# Patient Record
Sex: MALE | Race: BLACK OR AFRICAN AMERICAN | NOT HISPANIC OR LATINO | URBAN - METROPOLITAN AREA
[De-identification: names, ages, dates, MRNs, and addresses within clinical notes are randomized per-mention and may not be internally consistent; named-entity substitution may affect disease eponyms.]

---

## 2023-07-26 ENCOUNTER — HOSPITAL ENCOUNTER (EMERGENCY)
Facility: HOSPITAL | Age: 43
Discharge: HOME | End: 2023-07-26
Attending: EMERGENCY MEDICINE
Payer: MEDICAID

## 2023-07-26 VITALS
DIASTOLIC BLOOD PRESSURE: 89 MMHG | HEIGHT: 75 IN | TEMPERATURE: 99 F | SYSTOLIC BLOOD PRESSURE: 129 MMHG | WEIGHT: 180 LBS | RESPIRATION RATE: 18 BRPM | HEART RATE: 75 BPM | BODY MASS INDEX: 22.38 KG/M2 | OXYGEN SATURATION: 99 %

## 2023-07-26 DIAGNOSIS — L03.115 CELLULITIS OF RIGHT LOWER EXTREMITY: Primary | ICD-10-CM

## 2023-07-26 DIAGNOSIS — H61.22 HEARING LOSS OF LEFT EAR DUE TO CERUMEN IMPACTION: ICD-10-CM

## 2023-07-26 DIAGNOSIS — L02.415 ABSCESS OF RIGHT LEG: ICD-10-CM

## 2023-07-26 PROCEDURE — 99284 EMERGENCY DEPT VISIT MOD MDM: CPT

## 2023-07-26 RX ORDER — IBUPROFEN 800 MG/1
800 TABLET ORAL EVERY 6 HOURS PRN
Qty: 20 TABLET | Refills: 0 | OUTPATIENT
Start: 2023-07-26 | End: 2023-09-02

## 2023-07-26 RX ORDER — SULFAMETHOXAZOLE AND TRIMETHOPRIM 800; 160 MG/1; MG/1
1 TABLET ORAL 2 TIMES DAILY
Qty: 14 TABLET | Refills: 0 | Status: SHIPPED | OUTPATIENT
Start: 2023-07-26 | End: 2023-08-02

## 2023-07-26 NOTE — Clinical Note
"Jesse Pedersonbarb Fofana was seen and treated in our emergency department on 7/26/2023.  He may return to work on 07/27/2023.       If you have any questions or concerns, please don't hesitate to call.      MADELEINE Reece"

## 2023-07-26 NOTE — ED PROVIDER NOTES
Encounter Date: 7/26/2023    SCRIBE #1 NOTE: I, Zoey Radha, am scribing for, and in the presence of,  MADELEINE Reece.     History     Chief Complaint   Patient presents with    Cellulitis     Pt with abscess and cellulitis to RLE x 3 days. No fever      44 yo M with no PMHx presents to the ED for wound to the right lower posterior leg onset 3d ago. States it may have started as a bug bite. He has been noting drainage from the wound. He further reports hearing loss to his L ear. No other modifying factors. No other associated symptoms. No h/o DM.     The history is provided by the patient.   Review of patient's allergies indicates:  No Known Allergies  History reviewed. No pertinent past medical history.  History reviewed. No pertinent surgical history.  History reviewed. No pertinent family history.  Social History     Tobacco Use    Smoking status: Never    Smokeless tobacco: Never   Substance Use Topics    Alcohol use: Not Currently    Drug use: Not Currently     Review of Systems   Constitutional:  Negative for chills and fever.   HENT:  Positive for hearing loss (L). Negative for congestion, rhinorrhea and sore throat.    Eyes:  Negative for visual disturbance.   Respiratory:  Negative for cough and shortness of breath.    Cardiovascular:  Negative for chest pain.   Gastrointestinal:  Negative for abdominal pain, diarrhea, nausea and vomiting.   Genitourinary:  Negative for dysuria, frequency and hematuria.   Musculoskeletal:  Negative for back pain.   Skin:  Positive for wound (R lower leg, + drainage). Negative for rash.   Neurological:  Negative for dizziness, weakness and headaches.     Physical Exam     Initial Vitals [07/26/23 1735]   BP Pulse Resp Temp SpO2   (!) 149/90 95 18 98.7 °F (37.1 °C) 100 %      MAP       --         Physical Exam    Nursing note and vitals reviewed.  Constitutional: He appears well-developed and well-nourished. No distress.   HENT:   Head: Normocephalic.   Right Ear:  External ear normal.   Left Ear: External ear normal.   L cerumen impaction.    Eyes: Conjunctivae are normal.   Pulmonary/Chest: No respiratory distress.   Musculoskeletal:         General: Normal range of motion.     Neurological: He is alert.   Skin: Skin is warm and dry. No rash noted.   Draining abscess to R lower posterior leg.    Psychiatric: He has a normal mood and affect. His behavior is normal. Judgment and thought content normal.       ED Course   Procedures  Labs Reviewed - No data to display       Imaging Results    None          Medications - No data to display  Medical Decision Making:   Initial Assessment:   Patient has cellulitis and a draining assets to posterior right lower leg.  No incision and drainage necessary at this time.  Patient instructed to apply warm compresses to affected area I will discharge patient home with Bactrim and ibuprofen.  Patient given return precautions.  Patient also has left cerumen impaction.  Left ear irrigated without complication.  Some cerumen removed but patient is still has cerumen in ear canal.  We were unable to remove.  Patient instructed to follow with ENT and avoid Cotton tips in the ear.        Scribe Attestation:   Scribe #1: I performed the above scribed service and the documentation accurately describes the services I performed. I attest to the accuracy of the note.                 I, Sulema Carr PA-C , personally performed the services described in this documentation. All medical record entries made by the scribe were at my direction and in my presence. I have reviewed the chart and agree that the record reflects my personal performance and is accurate and complete.    Clinical Impression:   Final diagnoses:  [L03.115] Cellulitis of right lower extremity (Primary)  [H61.22] Hearing loss of left ear due to cerumen impaction  [L02.415] Abscess of right leg        ED Disposition Condition    Discharge Stable          ED Prescriptions       Medication  Sig Dispense Start Date End Date Auth. Provider    sulfamethoxazole-trimethoprim 800-160mg (BACTRIM DS) 800-160 mg Tab Take 1 tablet by mouth 2 (two) times daily. for 7 days 14 tablet 7/26/2023 8/2/2023 MADELEINE Reece    ibuprofen (ADVIL,MOTRIN) 800 MG tablet Take 1 tablet (800 mg total) by mouth every 6 (six) hours as needed for Pain. 20 tablet 7/26/2023 -- MADELEINE Reece          Follow-up Information    None          MADELEINE Reece  07/26/23 1946

## 2023-07-26 NOTE — FIRST PROVIDER EVALUATION
"Medical screening examination initiated.  I have conducted a focused provider triage encounter, findings are as follows:    Brief history of present illness:  43 year old male presents with swelling and pain to posterior right ankle, right lower leg x several days, reports he thought it started as a bug bite.     Vitals:    07/26/23 1735   BP: (!) 149/90   Pulse: 95   Resp: 18   Temp: 98.7 °F (37.1 °C)   TempSrc: Oral   SpO2: 100%   Weight: 81.6 kg (180 lb)   Height: 6' 3" (1.905 m)       Pertinent physical exam:  Alert, NAD. Right leg appears swollen and erythematous compared to left. Difficult to visualized posterior leg due to working boots and pants.     Brief workup plan:  Room for exam, possible procedure.     Preliminary workup initiated; this workup will be continued and followed by the physician or advanced practice provider that is assigned to the patient when roomed.    Sophia Deleon MD   5:37 PM    "

## 2023-08-16 ENCOUNTER — HOSPITAL ENCOUNTER (EMERGENCY)
Facility: HOSPITAL | Age: 43
Discharge: HOME | End: 2023-08-16
Attending: EMERGENCY MEDICINE
Payer: COMMERCIAL

## 2023-08-16 VITALS
BODY MASS INDEX: 22.62 KG/M2 | WEIGHT: 181 LBS | RESPIRATION RATE: 18 BRPM | SYSTOLIC BLOOD PRESSURE: 153 MMHG | OXYGEN SATURATION: 100 % | HEART RATE: 76 BPM | DIASTOLIC BLOOD PRESSURE: 105 MMHG | TEMPERATURE: 98 F

## 2023-08-16 DIAGNOSIS — L03.90 WOUND CELLULITIS: ICD-10-CM

## 2023-08-16 DIAGNOSIS — L03.90 WOUND CELLULITIS: Primary | ICD-10-CM

## 2023-08-16 PROCEDURE — 63600175 PHARM REV CODE 636 W HCPCS: Performed by: EMERGENCY MEDICINE

## 2023-08-16 PROCEDURE — 25000003 PHARM REV CODE 250: Performed by: EMERGENCY MEDICINE

## 2023-08-16 PROCEDURE — 96372 THER/PROPH/DIAG INJ SC/IM: CPT | Performed by: EMERGENCY MEDICINE

## 2023-08-16 PROCEDURE — 99284 EMERGENCY DEPT VISIT MOD MDM: CPT

## 2023-08-16 RX ORDER — MELOXICAM 15 MG/1
15 TABLET ORAL DAILY
Qty: 30 TABLET | Refills: 0 | Status: ON HOLD | OUTPATIENT
Start: 2023-08-16 | End: 2023-09-26 | Stop reason: HOSPADM

## 2023-08-16 RX ORDER — FAMOTIDINE 20 MG/1
20 TABLET, FILM COATED ORAL 2 TIMES DAILY
Qty: 60 TABLET | Refills: 0 | Status: SHIPPED | OUTPATIENT
Start: 2023-08-16 | End: 2024-08-15

## 2023-08-16 RX ORDER — DOXYCYCLINE 100 MG/1
100 CAPSULE ORAL 2 TIMES DAILY
Qty: 20 CAPSULE | Refills: 0 | Status: SHIPPED | OUTPATIENT
Start: 2023-08-16 | End: 2023-08-26

## 2023-08-16 RX ORDER — CIPROFLOXACIN 500 MG/1
500 TABLET ORAL
Status: COMPLETED | OUTPATIENT
Start: 2023-08-16 | End: 2023-08-16

## 2023-08-16 RX ORDER — KETOROLAC TROMETHAMINE 30 MG/ML
30 INJECTION, SOLUTION INTRAMUSCULAR; INTRAVENOUS
Status: COMPLETED | OUTPATIENT
Start: 2023-08-16 | End: 2023-08-16

## 2023-08-16 RX ORDER — DOXYCYCLINE HYCLATE 100 MG
100 TABLET ORAL
Status: COMPLETED | OUTPATIENT
Start: 2023-08-16 | End: 2023-08-16

## 2023-08-16 RX ORDER — CIPROFLOXACIN 500 MG/1
500 TABLET ORAL 2 TIMES DAILY
Qty: 20 TABLET | Refills: 0 | Status: SHIPPED | OUTPATIENT
Start: 2023-08-16 | End: 2023-08-26

## 2023-08-16 RX ORDER — TRAMADOL HYDROCHLORIDE 50 MG/1
50 TABLET ORAL EVERY 4 HOURS PRN
Qty: 12 TABLET | Refills: 0 | Status: ON HOLD | OUTPATIENT
Start: 2023-08-16 | End: 2023-09-26 | Stop reason: HOSPADM

## 2023-08-16 RX ADMIN — CIPROFLOXACIN HYDROCHLORIDE 500 MG: 500 TABLET, FILM COATED ORAL at 06:08

## 2023-08-16 RX ADMIN — DOXYCYCLINE HYCLATE 100 MG: 100 TABLET, COATED ORAL at 06:08

## 2023-08-16 RX ADMIN — KETOROLAC TROMETHAMINE 30 MG: 30 INJECTION, SOLUTION INTRAMUSCULAR; INTRAVENOUS at 06:08

## 2023-08-16 NOTE — ED PROVIDER NOTES
Encounter Date: 8/16/2023       History     Chief Complaint   Patient presents with    Wound Check     Wound to posterior right calf, wound bed yellow sloughing, 4+ edema to rle, skin shiny and taught, warm to touch     43 y.o. male No past medical history on file.     Patient notes that he has had a posterior lower leg skin wound for approximately 2 weeks he went to an outside facility who started him on Bactrim he took 1 dose of Bactrim and pain medication and states that he lost both.  Denies fevers chills nausea vomiting diarrhea dysuria or other complaints.  Notes that the area is swollen however when he elevates leg the edema self resolves.  Also notes that he works around dirty water at work.      Review of patient's allergies indicates:  No Known Allergies  No past medical history on file.  No past surgical history on file.  No family history on file.  Social History     Tobacco Use    Smoking status: Never    Smokeless tobacco: Never   Substance Use Topics    Alcohol use: Not Currently    Drug use: Not Currently     Review of Systems   Constitutional:  Negative for fever.   HENT:  Negative for sore throat.    Respiratory:  Negative for shortness of breath.    Cardiovascular:  Negative for chest pain.   Gastrointestinal:  Negative for nausea.   Genitourinary:  Negative for dysuria.   Musculoskeletal:  Negative for back pain.   Skin:  Negative for rash.   Neurological:  Negative for weakness.   Hematological:  Does not bruise/bleed easily.   All other systems reviewed and are negative.      Physical Exam     Initial Vitals [08/16/23 1719]   BP Pulse Resp Temp SpO2   (!) 153/105 76 18 97.5 °F (36.4 °C) 100 %      MAP       --         Physical Exam    Nursing note and vitals reviewed.  Constitutional: He appears well-developed and well-nourished.   HENT:   Head: Normocephalic and atraumatic.   Eyes: EOM are normal. Pupils are equal, round, and reactive to light.   Cardiovascular:  Normal rate and regular  rhythm.           Pulmonary/Chest: Effort normal.   Abdominal: He exhibits no distension.   Musculoskeletal:         General: No tenderness or edema.     Neurological: He is alert and oriented to person, place, and time. No cranial nerve deficit.   Skin: Skin is warm and dry.   Psychiatric: He has a normal mood and affect.     Posterior RLE just below gastroch pt has a 2.5 x 1cm wound with yellow base    ED Course   Procedures  MEDICAL DECISION MAKING    After review of the patient's physical exam, ED testing, and history/symptoms, relevant labs, imaging, available outside records  a wide differential was considered including but not limited to: infectious, traumatic, vascular, toxicological , metabolic, malignant, ischemic, embolic, psychological, genetic, iatrogenic, idiopathic, medication reaction, environmental exposure, substance dependence/intoxication/withdrawal, electrolyte abnormality, blood dyscrasia, and other etiologies.        labs/imaging/interventions include:       Medications - No data to display  Labs Reviewed - No data to display   X-Ray Tibia Fibula 2 View Right    (Results Pending)     I have independently evaluated and interpreted all available labs and imaging to the extent of the scope of my practice.  The suspected diagnosis, treatment and plan were discussed with the patient. All questions or concerns have been addressed.    Note was created using voice recognition software. Note may have occasional typographical or grammatical errors , garbled syntax, and other bizarre constructions that may not have been identified and edited despite good brian initial review prior to signing.     Labs Reviewed - No data to display       Imaging Results    None          Medications - No data to display   PT refusing xray.    Will place on cipro/doxy/nsaids, give doctor's note for work, refer to wound care. Given he only took bactrim 1 day not a treatment failure.                     Clinical Impression:    Final diagnoses:  [L03.90] Wound cellulitis  [L03.90] Wound cellulitis - eval for Alejandra Jordan MD  08/16/23 8868

## 2023-08-16 NOTE — ED NOTES
Patient presents to ED for wound check. Patient states 1-2 weeks ago, dx with staph infection. Patient states was placed on Bactrim and took one day of medication, but then lost medication. Patient states swelling to leg will decrease over night, but in the morning with walking swelling gets worse. Patient states has been having yellow drainage from wound.

## 2023-08-16 NOTE — DISCHARGE INSTRUCTIONS

## 2023-08-16 NOTE — Clinical Note
"Jesse Pedersonbarb Fofana was seen and treated in our emergency department on 8/16/2023.  He may return to work on 08/21/2023.       If you have any questions or concerns, please don't hesitate to call.      Alejandra Yoo MD"

## 2023-09-02 ENCOUNTER — HOSPITAL ENCOUNTER (EMERGENCY)
Facility: HOSPITAL | Age: 43
Discharge: HOME | End: 2023-09-02
Attending: EMERGENCY MEDICINE
Payer: MEDICAID

## 2023-09-02 VITALS
OXYGEN SATURATION: 100 % | WEIGHT: 180 LBS | DIASTOLIC BLOOD PRESSURE: 78 MMHG | TEMPERATURE: 98 F | HEART RATE: 66 BPM | RESPIRATION RATE: 18 BRPM | BODY MASS INDEX: 22.38 KG/M2 | HEIGHT: 75 IN | SYSTOLIC BLOOD PRESSURE: 141 MMHG

## 2023-09-02 DIAGNOSIS — R60.0 EDEMA OF RIGHT LOWER LEG: ICD-10-CM

## 2023-09-02 DIAGNOSIS — S81.801A WOUND OF RIGHT LOWER EXTREMITY, INITIAL ENCOUNTER: Primary | ICD-10-CM

## 2023-09-02 LAB
ALBUMIN SERPL BCP-MCNC: 3.9 G/DL (ref 3.5–5.2)
ALP SERPL-CCNC: 54 U/L (ref 55–135)
ALT SERPL W/O P-5'-P-CCNC: 16 U/L (ref 10–44)
ANION GAP SERPL CALC-SCNC: 14 MMOL/L (ref 8–16)
AST SERPL-CCNC: 29 U/L (ref 10–40)
BASOPHILS # BLD AUTO: 0.04 K/UL (ref 0–0.2)
BASOPHILS NFR BLD: 0.5 % (ref 0–1.9)
BILIRUB SERPL-MCNC: 0.2 MG/DL (ref 0.1–1)
BUN SERPL-MCNC: 10 MG/DL (ref 6–20)
CALCIUM SERPL-MCNC: 9.8 MG/DL (ref 8.7–10.5)
CHLORIDE SERPL-SCNC: 102 MMOL/L (ref 95–110)
CO2 SERPL-SCNC: 23 MMOL/L (ref 23–29)
CREAT SERPL-MCNC: 0.9 MG/DL (ref 0.5–1.4)
DIFFERENTIAL METHOD: ABNORMAL
EOSINOPHIL # BLD AUTO: 0.2 K/UL (ref 0–0.5)
EOSINOPHIL NFR BLD: 2.8 % (ref 0–8)
ERYTHROCYTE [DISTWIDTH] IN BLOOD BY AUTOMATED COUNT: 13.8 % (ref 11.5–14.5)
EST. GFR  (NO RACE VARIABLE): >60 ML/MIN/1.73 M^2
GLUCOSE SERPL-MCNC: 62 MG/DL (ref 70–110)
HCT VFR BLD AUTO: 44.3 % (ref 40–54)
HGB BLD-MCNC: 14.8 G/DL (ref 14–18)
IMM GRANULOCYTES # BLD AUTO: 0.02 K/UL (ref 0–0.04)
IMM GRANULOCYTES NFR BLD AUTO: 0.2 % (ref 0–0.5)
LYMPHOCYTES # BLD AUTO: 2.3 K/UL (ref 1–4.8)
LYMPHOCYTES NFR BLD: 28.3 % (ref 18–48)
MCH RBC QN AUTO: 31.4 PG (ref 27–31)
MCHC RBC AUTO-ENTMCNC: 33.4 G/DL (ref 32–36)
MCV RBC AUTO: 94 FL (ref 82–98)
MONOCYTES # BLD AUTO: 0.8 K/UL (ref 0.3–1)
MONOCYTES NFR BLD: 9.6 % (ref 4–15)
NEUTROPHILS # BLD AUTO: 4.8 K/UL (ref 1.8–7.7)
NEUTROPHILS NFR BLD: 58.6 % (ref 38–73)
NRBC BLD-RTO: 0 /100 WBC
PLATELET # BLD AUTO: 253 K/UL (ref 150–450)
PMV BLD AUTO: 9.1 FL (ref 9.2–12.9)
POTASSIUM SERPL-SCNC: 4.5 MMOL/L (ref 3.5–5.1)
PROT SERPL-MCNC: 8.4 G/DL (ref 6–8.4)
RBC # BLD AUTO: 4.72 M/UL (ref 4.6–6.2)
SODIUM SERPL-SCNC: 139 MMOL/L (ref 136–145)
WBC # BLD AUTO: 8.16 K/UL (ref 3.9–12.7)

## 2023-09-02 PROCEDURE — 25000003 PHARM REV CODE 250: Performed by: EMERGENCY MEDICINE

## 2023-09-02 PROCEDURE — 85025 COMPLETE CBC W/AUTO DIFF WBC: CPT | Performed by: EMERGENCY MEDICINE

## 2023-09-02 PROCEDURE — 96367 TX/PROPH/DG ADDL SEQ IV INF: CPT

## 2023-09-02 PROCEDURE — 87186 SC STD MICRODIL/AGAR DIL: CPT | Mod: 59 | Performed by: EMERGENCY MEDICINE

## 2023-09-02 PROCEDURE — 96366 THER/PROPH/DIAG IV INF ADDON: CPT

## 2023-09-02 PROCEDURE — 63600175 PHARM REV CODE 636 W HCPCS: Performed by: EMERGENCY MEDICINE

## 2023-09-02 PROCEDURE — 96365 THER/PROPH/DIAG IV INF INIT: CPT

## 2023-09-02 PROCEDURE — 99284 EMERGENCY DEPT VISIT MOD MDM: CPT | Mod: 25

## 2023-09-02 PROCEDURE — 87077 CULTURE AEROBIC IDENTIFY: CPT | Performed by: EMERGENCY MEDICINE

## 2023-09-02 PROCEDURE — 87040 BLOOD CULTURE FOR BACTERIA: CPT | Mod: 59 | Performed by: EMERGENCY MEDICINE

## 2023-09-02 PROCEDURE — 80053 COMPREHEN METABOLIC PANEL: CPT | Performed by: EMERGENCY MEDICINE

## 2023-09-02 RX ORDER — IBUPROFEN 600 MG/1
600 TABLET ORAL EVERY 6 HOURS PRN
Qty: 20 TABLET | Refills: 0 | Status: ON HOLD | OUTPATIENT
Start: 2023-09-02 | End: 2023-09-26 | Stop reason: HOSPADM

## 2023-09-02 RX ORDER — SULFAMETHOXAZOLE AND TRIMETHOPRIM 800; 160 MG/1; MG/1
1 TABLET ORAL 2 TIMES DAILY
Qty: 14 TABLET | Refills: 0 | Status: SHIPPED | OUTPATIENT
Start: 2023-09-02 | End: 2023-09-09

## 2023-09-02 RX ORDER — SULFAMETHOXAZOLE AND TRIMETHOPRIM 800; 160 MG/1; MG/1
1 TABLET ORAL 2 TIMES DAILY
Qty: 14 TABLET | Refills: 0 | Status: SHIPPED | OUTPATIENT
Start: 2023-09-02 | End: 2023-09-02 | Stop reason: ALTCHOICE

## 2023-09-02 RX ORDER — IBUPROFEN 600 MG/1
600 TABLET ORAL
Status: COMPLETED | OUTPATIENT
Start: 2023-09-02 | End: 2023-09-02

## 2023-09-02 RX ADMIN — PIPERACILLIN AND TAZOBACTAM 4.5 G: 4; .5 INJECTION, POWDER, LYOPHILIZED, FOR SOLUTION INTRAVENOUS; PARENTERAL at 04:09

## 2023-09-02 RX ADMIN — IBUPROFEN 600 MG: 600 TABLET ORAL at 05:09

## 2023-09-02 RX ADMIN — BACITRACIN ZINC, NEOMYCIN, POLYMYXIN B 1 EACH: 400; 3.5; 5 OINTMENT TOPICAL at 05:09

## 2023-09-02 RX ADMIN — VANCOMYCIN HYDROCHLORIDE 1750 MG: 10 INJECTION, POWDER, LYOPHILIZED, FOR SOLUTION INTRAVENOUS at 05:09

## 2023-09-02 NOTE — ED TRIAGE NOTES
Patient comes in with posterior right calf wound with 3+ edema, and slough noted to wound bed, states has been dealing with this wound for several weeks, has not been compliant die to drinking.

## 2023-09-02 NOTE — DISCHARGE INSTRUCTIONS
Please apply Neosporin dressings to your wound daily and change them daily.  Please apply an Ace compressive bandage to the swollen parts of your right foot ankle and lower leg.  Return immediately if you get worse or if new problems develop.  Please follow-up at the wound care clinic above.  You may also follow-up with the wound care doctor above.  Call Monday morning for appointments.  Elevate your right leg.

## 2023-09-02 NOTE — LETTER
September 3, 2023    Jesse Fofana  [address] [address]             [facility] - Emergency Dept  [address] [place], [place] [address] [address]  Phone: [phone] Dear Mr. Fofana:    One of your labs came back and were concerned about an infection in your blood.  Important that you us or come to the emergency department as soon as possible.  Our phone #[phone]    We have attempted to call you but none of your contacts answered or had voicemail that we can leave a message and your phone did not answer.    Sincerely,        Yashira Moreno MD

## 2023-09-02 NOTE — ED PROVIDER NOTES
"Encounter Date: 9/2/2023    SCRIBE #1 NOTE: I, Delisa Velasquez, am scribing for, and in the presence of,  Teofilo Roman MD. I have scribed the following portions of the note - Other sections scribed: HPI, ROS.       History     Chief Complaint   Patient presents with    Wound Check     PT presents to ED requesting evaluation of wound to posterior R lower leg/ankle. PT reports was previously seen and dx with "infection", but states he "didn't do the right thing" regarding care for wound. Ulcerated wound to posterior ankle noted, approx 4x4 with swelling noted from calf to knee. PT reports leg swelling began approx 2 days ago. Denies fever.      Jesse Fofana is a 43 y.o. male, with no PMHx, who presents to the ED with concerns about wound on posterior right ankle. Patient also has swelling to right ankle, foot, and calf that began in his foot 2 days ago. Pt states he did not follow up with wound care and does not still have his antibiotics. Pt states he was told to stay off his leg, but that he "has to work," and did not. No other exacerbating or alleviating factors. Denies vomiting, diarrhea, abdominal pain, chest pain, SOB, dysuria, chills, or other associated symptoms.       The history is provided by the patient. No  was used.     Review of patient's allergies indicates:  No Known Allergies  History reviewed. No pertinent past medical history.  History reviewed. No pertinent surgical history.  History reviewed. No pertinent family history.  Social History     Tobacco Use    Smoking status: Never    Smokeless tobacco: Never   Substance Use Topics    Alcohol use: Yes     Alcohol/week: 3.0 - 4.0 standard drinks of alcohol     Types: 3 - 4 Cans of beer per week     Comment: daily drinker    Drug use: Not Currently     Review of Systems   Constitutional:  Negative for chills, diaphoresis and fever.   HENT:  Negative for ear pain and sore throat.    Eyes:  Negative for pain.   Respiratory: "  Negative for cough and shortness of breath.    Cardiovascular:  Negative for chest pain.   Gastrointestinal:  Negative for abdominal pain, diarrhea, nausea and vomiting.   Genitourinary:  Negative for dysuria.   Musculoskeletal:  Negative for back pain.        (-) Arm or leg trouble.    Skin:  Positive for wound (posterior right ankle with swelling in ankle, calf, and foot). Negative for rash.   Neurological:  Negative for headaches.   Psychiatric/Behavioral:  Negative for confusion.        Physical Exam     Initial Vitals [09/02/23 1615]   BP Pulse Resp Temp SpO2   (!) 162/103 72 18 98.1 °F (36.7 °C) 99 %      MAP       --         Physical Exam  The patient was examined specifically for the following:   General:No significant distress, Good color, Warm and dry. Head and neck:Scalp atraumatic, Neck supple. Neurological:Appropriate conversation, Gross motor deficits. Eyes:Conjugate gaze, Clear corneas. ENT: No epistaxis. Cardiac: Regular rate and rhythm, Grossly normal heart tones. Pulmonary: Wheezing, Rales. Gastrointestinal: Abdominal tenderness, Abdominal distention. Musculoskeletal: Extremity deformity, Apparent pain with range of motion of the joints. Skin: Rash.   The findings on examination were normal except for the following:  The patient has marked edema of the right foot, the right ankle, the edema extends to the proximal 3rd of the right thigh.  There is minimal erythema and warmth.  There is a 4 x 4 cm wound in the posterior aspect of the right ankle. .  The heart tones are normal.  The abdomen is soft.  The patient is not febrile.  There is no tachycardia.  ED Course   Procedures  Labs Reviewed   COMPREHENSIVE METABOLIC PANEL - Abnormal; Notable for the following components:       Result Value    Glucose 62 (*)     Alkaline Phosphatase 54 (*)     All other components within normal limits   CBC W/ AUTO DIFFERENTIAL - Abnormal; Notable for the following components:    MCH 31.4 (*)     MPV 9.1 (*)     All  other components within normal limits   CULTURE, BLOOD   CULTURE, BLOOD          Imaging Results    None          Medications   vancomycin (VANCOCIN) 1,750 mg in dextrose 5 % (D5W) 500 mL IVPB (1,750 mg Intravenous New Bag 9/2/23 1736)   piperacillin-tazobactam (ZOSYN) 4.5 g in dextrose 5 % in water (D5W) 100 mL IVPB (MB+) (0 g Intravenous Stopped 9/2/23 1814)   ibuprofen tablet 600 mg (600 mg Oral Given 9/2/23 1720)   neomycin-bacitracnZn-polymyxnB packet (1 each Topical (Top) Given 9/2/23 1720)     Medical Decision Making  Amount and/or Complexity of Data Reviewed  Labs: ordered.    Risk  OTC drugs.  Prescription drug management.    This patient presents emergency room with swelling of his right foot and ankle.  He also has swelling of the right lower leg.  The proximal leg is not affected the popliteal fossa medial thigh are not affected.  I doubt deep venous thrombosis.  I morning whether there is some element of lymphedema here the patient has chronic wound on the posterior aspect of the right leg it does not look deep.  It has minimal yellow eschar.  The patient is not febrile or tachycardic.  I doubt systemic infection.  I will treat this patient with Bactrim.  I will have him follow up at wound care.  I will have him wrap his leg.  I will use an Ace bandage.  I will have him changes dressings daily.  I will have him elevate his right leg.        Scribe Attestation:   Scribe #1: I performed the above scribed service and the documentation accurately describes the services I performed. I attest to the accuracy of the note.               I personally performed the services described in this documentation.  All medical record  entries made by the scribe are at my direction and in my presence.  Signed, Dr. Roman          Clinical Impression:   Final diagnoses:  [S81.801A] Wound of right lower extremity, initial encounter (Primary)  [R60.0] Edema of right lower leg        ED Disposition Condition    Discharge  Stable          ED Prescriptions       Medication Sig Dispense Start Date End Date Auth. Provider    sulfamethoxazole-trimethoprim 800-160mg (BACTRIM DS) 800-160 mg Tab Take 1 tablet by mouth 2 (two) times daily. for 7 days 14 tablet 9/2/2023 9/9/2023 Teofilo Roman MD    sulfamethoxazole-trimethoprim 800-160mg (BACTRIM DS) 800-160 mg Tab Take 1 tablet by mouth 2 (two) times daily. for 7 days 14 tablet 9/2/2023 9/9/2023 Teofilo Roman MD    ibuprofen (ADVIL,MOTRIN) 600 MG tablet Take 1 tablet (600 mg total) by mouth every 6 (six) hours as needed. 20 tablet 9/2/2023 -- Teofilo Roman MD          Follow-up Information       Follow up With Specialties Details Why Contact Info Additional Information    [facility] - Wound Care Wound Care In 3 days  [address]  [facility] [address] [phone] Please park in garage or rear surface lot and enter through Patient Registration entrance. Check in at first floor main registration.     Oliver Canada MD Family Medicine, Wound Care In 3 days  [address] [facility] [address] [facility] [address] [phone]                Teofilo Roman MD  09/02/23 3663

## 2023-09-03 NOTE — PROVIDER PROGRESS NOTES - EMERGENCY DEPT.
Encounter Date: 9/2/2023    ED Physician Progress Notes        Was called concerning blood cultures positive in 1 out of for culture bottles.  Gram positive cocci in pairs consistent with Streptococcus.    Performed a chart review attempted to reach the patient.  I called the home phone as well as the contact information of significant other.  No answer on either.  I was unable to leave a voicemail on either.    Also called sister with no answer, and unable to leave a message   Brice Mcknight [phone]       Certified letter is sent to address on the chart.

## 2023-09-06 LAB
BACTERIA BLD CULT: ABNORMAL
BACTERIA BLD CULT: NORMAL

## 2023-09-24 ENCOUNTER — HOSPITAL ENCOUNTER (INPATIENT)
Facility: HOSPITAL | Age: 43
LOS: 3 days | Discharge: HOME | DRG: 603 | End: 2023-09-27
Attending: EMERGENCY MEDICINE | Admitting: STUDENT IN AN ORGANIZED HEALTH CARE EDUCATION/TRAINING PROGRAM
Payer: MEDICAID

## 2023-09-24 DIAGNOSIS — R78.81 BACTEREMIA: ICD-10-CM

## 2023-09-24 DIAGNOSIS — I82.511 CHRONIC DEEP VEIN THROMBOSIS (DVT) OF FEMORAL VEIN OF RIGHT LOWER EXTREMITY: ICD-10-CM

## 2023-09-24 DIAGNOSIS — Z78.9 FAILURE OF OUTPATIENT TREATMENT: Primary | ICD-10-CM

## 2023-09-24 DIAGNOSIS — I87.2 VENOUS INSUFFICIENCY: ICD-10-CM

## 2023-09-24 DIAGNOSIS — R07.9 CHEST PAIN: ICD-10-CM

## 2023-09-24 DIAGNOSIS — I82.531 CHRONIC DEEP VEIN THROMBOSIS (DVT) OF POPLITEAL VEIN OF RIGHT LOWER EXTREMITY: ICD-10-CM

## 2023-09-24 DIAGNOSIS — I82.4Y1 ACUTE DEEP VEIN THROMBOSIS (DVT) OF PROXIMAL VEIN OF RIGHT LOWER EXTREMITY: ICD-10-CM

## 2023-09-24 DIAGNOSIS — M79.89 RIGHT LEG SWELLING: ICD-10-CM

## 2023-09-24 DIAGNOSIS — E83.39 HYPOPHOSPHATEMIA: ICD-10-CM

## 2023-09-24 DIAGNOSIS — L03.115 CELLULITIS OF RIGHT LOWER EXTREMITY: ICD-10-CM

## 2023-09-24 DIAGNOSIS — L03.115 CELLULITIS OF RIGHT ANKLE: ICD-10-CM

## 2023-09-24 PROBLEM — M25.572 CHRONIC PAIN OF LEFT ANKLE: Status: ACTIVE | Noted: 2018-05-24

## 2023-09-24 PROBLEM — F10.10 ALCOHOL ABUSE: Status: ACTIVE | Noted: 2023-05-09

## 2023-09-24 PROBLEM — Z86.711 HISTORY OF PULMONARY EMBOLUS (PE): Status: ACTIVE | Noted: 2023-09-24

## 2023-09-24 PROBLEM — G89.29 CHRONIC PAIN OF LEFT ANKLE: Status: ACTIVE | Noted: 2018-05-24

## 2023-09-24 PROBLEM — I26.99 ACUTE PULMONARY EMBOLISM: Status: ACTIVE | Noted: 2023-05-09

## 2023-09-24 LAB
ALBUMIN SERPL BCP-MCNC: 3.5 G/DL (ref 3.5–5.2)
ALP SERPL-CCNC: 61 U/L (ref 55–135)
ALT SERPL W/O P-5'-P-CCNC: 13 U/L (ref 10–44)
ANION GAP SERPL CALC-SCNC: 10 MMOL/L (ref 8–16)
AST SERPL-CCNC: 23 U/L (ref 10–40)
BASOPHILS # BLD AUTO: 0.04 K/UL (ref 0–0.2)
BASOPHILS NFR BLD: 0.4 % (ref 0–1.9)
BILIRUB SERPL-MCNC: 0.2 MG/DL (ref 0.1–1)
BUN SERPL-MCNC: 8 MG/DL (ref 6–20)
CALCIUM SERPL-MCNC: 8.7 MG/DL (ref 8.7–10.5)
CHLORIDE SERPL-SCNC: 107 MMOL/L (ref 95–110)
CO2 SERPL-SCNC: 24 MMOL/L (ref 23–29)
CREAT SERPL-MCNC: 0.7 MG/DL (ref 0.5–1.4)
DIFFERENTIAL METHOD: ABNORMAL
EOSINOPHIL # BLD AUTO: 0.2 K/UL (ref 0–0.5)
EOSINOPHIL NFR BLD: 1.8 % (ref 0–8)
ERYTHROCYTE [DISTWIDTH] IN BLOOD BY AUTOMATED COUNT: 13.8 % (ref 11.5–14.5)
EST. GFR  (NO RACE VARIABLE): >60 ML/MIN/1.73 M^2
GLUCOSE SERPL-MCNC: 91 MG/DL (ref 70–110)
HCT VFR BLD AUTO: 41.9 % (ref 40–54)
HGB BLD-MCNC: 14 G/DL (ref 14–18)
IMM GRANULOCYTES # BLD AUTO: 0.02 K/UL (ref 0–0.04)
IMM GRANULOCYTES NFR BLD AUTO: 0.2 % (ref 0–0.5)
LYMPHOCYTES # BLD AUTO: 2.5 K/UL (ref 1–4.8)
LYMPHOCYTES NFR BLD: 28.1 % (ref 18–48)
MCH RBC QN AUTO: 31 PG (ref 27–31)
MCHC RBC AUTO-ENTMCNC: 33.4 G/DL (ref 32–36)
MCV RBC AUTO: 93 FL (ref 82–98)
MONOCYTES # BLD AUTO: 0.6 K/UL (ref 0.3–1)
MONOCYTES NFR BLD: 6.2 % (ref 4–15)
NEUTROPHILS # BLD AUTO: 5.7 K/UL (ref 1.8–7.7)
NEUTROPHILS NFR BLD: 63.3 % (ref 38–73)
NRBC BLD-RTO: 0 /100 WBC
PLATELET # BLD AUTO: 255 K/UL (ref 150–450)
PMV BLD AUTO: 9 FL (ref 9.2–12.9)
POTASSIUM SERPL-SCNC: 4.1 MMOL/L (ref 3.5–5.1)
PROCALCITONIN SERPL IA-MCNC: 0.08 NG/ML
PROT SERPL-MCNC: 7.9 G/DL (ref 6–8.4)
RBC # BLD AUTO: 4.52 M/UL (ref 4.6–6.2)
SODIUM SERPL-SCNC: 141 MMOL/L (ref 136–145)
WBC # BLD AUTO: 8.98 K/UL (ref 3.9–12.7)

## 2023-09-24 PROCEDURE — 25000003 PHARM REV CODE 250: Performed by: NURSE PRACTITIONER

## 2023-09-24 PROCEDURE — 99285 EMERGENCY DEPT VISIT HI MDM: CPT | Mod: 25

## 2023-09-24 PROCEDURE — 87040 BLOOD CULTURE FOR BACTERIA: CPT | Performed by: NURSE PRACTITIONER

## 2023-09-24 PROCEDURE — 11000001 HC ACUTE MED/SURG PRIVATE ROOM

## 2023-09-24 PROCEDURE — 96365 THER/PROPH/DIAG IV INF INIT: CPT

## 2023-09-24 PROCEDURE — 96375 TX/PRO/DX INJ NEW DRUG ADDON: CPT

## 2023-09-24 PROCEDURE — 96367 TX/PROPH/DG ADDL SEQ IV INF: CPT

## 2023-09-24 PROCEDURE — 63600175 PHARM REV CODE 636 W HCPCS: Performed by: STUDENT IN AN ORGANIZED HEALTH CARE EDUCATION/TRAINING PROGRAM

## 2023-09-24 PROCEDURE — 85025 COMPLETE CBC W/AUTO DIFF WBC: CPT | Performed by: NURSE PRACTITIONER

## 2023-09-24 PROCEDURE — 25000003 PHARM REV CODE 250: Performed by: STUDENT IN AN ORGANIZED HEALTH CARE EDUCATION/TRAINING PROGRAM

## 2023-09-24 PROCEDURE — 80053 COMPREHEN METABOLIC PANEL: CPT | Performed by: NURSE PRACTITIONER

## 2023-09-24 PROCEDURE — 63600175 PHARM REV CODE 636 W HCPCS: Performed by: EMERGENCY MEDICINE

## 2023-09-24 PROCEDURE — 63600175 PHARM REV CODE 636 W HCPCS: Performed by: NURSE PRACTITIONER

## 2023-09-24 PROCEDURE — 25000003 PHARM REV CODE 250: Performed by: EMERGENCY MEDICINE

## 2023-09-24 PROCEDURE — 84145 PROCALCITONIN (PCT): CPT | Performed by: STUDENT IN AN ORGANIZED HEALTH CARE EDUCATION/TRAINING PROGRAM

## 2023-09-24 PROCEDURE — 36415 COLL VENOUS BLD VENIPUNCTURE: CPT | Performed by: STUDENT IN AN ORGANIZED HEALTH CARE EDUCATION/TRAINING PROGRAM

## 2023-09-24 RX ORDER — GLUCAGON 1 MG
1 KIT INJECTION
Status: DISCONTINUED | OUTPATIENT
Start: 2023-09-24 | End: 2023-09-27 | Stop reason: HOSPADM

## 2023-09-24 RX ORDER — MUPIROCIN 20 MG/G
1 OINTMENT TOPICAL
Status: COMPLETED | OUTPATIENT
Start: 2023-09-24 | End: 2023-09-24

## 2023-09-24 RX ORDER — ONDANSETRON 2 MG/ML
4 INJECTION INTRAMUSCULAR; INTRAVENOUS
Status: COMPLETED | OUTPATIENT
Start: 2023-09-24 | End: 2023-09-24

## 2023-09-24 RX ORDER — ENOXAPARIN SODIUM 100 MG/ML
1 INJECTION SUBCUTANEOUS EVERY 24 HOURS
Status: DISCONTINUED | OUTPATIENT
Start: 2023-09-24 | End: 2023-09-24

## 2023-09-24 RX ORDER — IBUPROFEN 200 MG
24 TABLET ORAL
Status: DISCONTINUED | OUTPATIENT
Start: 2023-09-24 | End: 2023-09-27 | Stop reason: HOSPADM

## 2023-09-24 RX ORDER — IBUPROFEN 200 MG
16 TABLET ORAL
Status: DISCONTINUED | OUTPATIENT
Start: 2023-09-24 | End: 2023-09-27 | Stop reason: HOSPADM

## 2023-09-24 RX ORDER — HYDROMORPHONE HYDROCHLORIDE 1 MG/ML
1 INJECTION, SOLUTION INTRAMUSCULAR; INTRAVENOUS; SUBCUTANEOUS
Status: COMPLETED | OUTPATIENT
Start: 2023-09-24 | End: 2023-09-24

## 2023-09-24 RX ORDER — MORPHINE SULFATE 4 MG/ML
4 INJECTION, SOLUTION INTRAMUSCULAR; INTRAVENOUS EVERY 4 HOURS PRN
Status: DISCONTINUED | OUTPATIENT
Start: 2023-09-24 | End: 2023-09-26

## 2023-09-24 RX ORDER — NALOXONE HCL 0.4 MG/ML
0.02 VIAL (ML) INJECTION
Status: DISCONTINUED | OUTPATIENT
Start: 2023-09-24 | End: 2023-09-27 | Stop reason: HOSPADM

## 2023-09-24 RX ORDER — HYDROCODONE BITARTRATE AND ACETAMINOPHEN 5; 325 MG/1; MG/1
1 TABLET ORAL EVERY 6 HOURS PRN
Status: DISCONTINUED | OUTPATIENT
Start: 2023-09-24 | End: 2023-09-27 | Stop reason: HOSPADM

## 2023-09-24 RX ORDER — SODIUM CHLORIDE 0.9 % (FLUSH) 0.9 %
10 SYRINGE (ML) INJECTION EVERY 12 HOURS PRN
Status: DISCONTINUED | OUTPATIENT
Start: 2023-09-24 | End: 2023-09-27 | Stop reason: HOSPADM

## 2023-09-24 RX ORDER — ONDANSETRON 2 MG/ML
4 INJECTION INTRAMUSCULAR; INTRAVENOUS EVERY 8 HOURS PRN
Status: DISCONTINUED | OUTPATIENT
Start: 2023-09-24 | End: 2023-09-27 | Stop reason: HOSPADM

## 2023-09-24 RX ORDER — ACETAMINOPHEN 325 MG/1
650 TABLET ORAL EVERY 4 HOURS PRN
Status: DISCONTINUED | OUTPATIENT
Start: 2023-09-24 | End: 2023-09-27 | Stop reason: HOSPADM

## 2023-09-24 RX ADMIN — PIPERACILLIN AND TAZOBACTAM 4.5 G: 4; .5 INJECTION, POWDER, LYOPHILIZED, FOR SOLUTION INTRAVENOUS; PARENTERAL at 11:09

## 2023-09-24 RX ADMIN — HYDROMORPHONE HYDROCHLORIDE 1 MG: 1 INJECTION, SOLUTION INTRAMUSCULAR; INTRAVENOUS; SUBCUTANEOUS at 11:09

## 2023-09-24 RX ADMIN — ONDANSETRON 4 MG: 2 INJECTION INTRAMUSCULAR; INTRAVENOUS at 11:09

## 2023-09-24 RX ADMIN — MUPIROCIN 22 G: 20 OINTMENT TOPICAL at 11:09

## 2023-09-24 RX ADMIN — APIXABAN 10 MG: 5 TABLET, FILM COATED ORAL at 08:09

## 2023-09-24 RX ADMIN — MORPHINE SULFATE 4 MG: 4 INJECTION, SOLUTION INTRAMUSCULAR; INTRAVENOUS at 08:09

## 2023-09-24 RX ADMIN — VANCOMYCIN HYDROCHLORIDE 1750 MG: 10 INJECTION, POWDER, LYOPHILIZED, FOR SOLUTION INTRAVENOUS at 01:09

## 2023-09-24 RX ADMIN — MORPHINE SULFATE 4 MG: 4 INJECTION, SOLUTION INTRAMUSCULAR; INTRAVENOUS at 04:09

## 2023-09-24 RX ADMIN — PIPERACILLIN AND TAZOBACTAM 4.5 G: 4; .5 INJECTION, POWDER, LYOPHILIZED, FOR SOLUTION INTRAVENOUS; PARENTERAL at 07:09

## 2023-09-24 RX ADMIN — ENOXAPARIN SODIUM 80 MG: 80 INJECTION SUBCUTANEOUS at 02:09

## 2023-09-24 NOTE — ASSESSMENT & PLAN NOTE
Presents with unhealing cellulitis to the right lower extremity.  Has been prescribed Bactrim, doxycycline and ciprofloxacin in the past with no improvement.  Unclear if he was actually taking these medications though.   - IV zosyn/vanc and de-escalate  - wound culture ordered  - xray with no joint involvement  - continue with supportive care

## 2023-09-24 NOTE — ED PROVIDER NOTES
Encounter Date: 9/24/2023       History     Chief Complaint   Patient presents with    Ankle Pain     Reports abscess to right ankle x's 3 wks. Reports was seen and given ibuprofen. Denies fever.      Chief complaint: Right ankle infection    History of present illness: Patient is a 43-year-old male who presents the emergency department for right ankle pain secondary to infection.  He was seen here 1st on July 26, 2023 by MADELEINE Leone, who diagnosed him with cellulitis and abscess she prescribed Bactrim DS and ibuprofen which was ineffective.  The patient returned on August 16, 2023 was seen by Dr. Yoo who diagnosed him with wound cellulitis prescribed Cipro doxycycline Mobic and tramadol patient was referred to wound clinic and discharged home.  He returned on September 2nd diagnosed with wound of right lower extremity by Dr. Roman prescribed Bactrim ds and ibuprofen.  He is returned today for worsening pain drainage and odor.    The history is provided by the patient. No  was used.     Review of patient's allergies indicates:  No Known Allergies  No past medical history on file.  No past surgical history on file.  No family history on file.  Social History     Tobacco Use    Smoking status: Never    Smokeless tobacco: Never   Substance Use Topics    Alcohol use: Yes     Alcohol/week: 3.0 - 4.0 standard drinks of alcohol     Types: 3 - 4 Cans of beer per week     Comment: daily drinker    Drug use: Not Currently     Review of Systems   Constitutional:  Negative for appetite change, chills, diaphoresis, fatigue and fever.   HENT:  Negative for congestion, ear discharge, ear pain, postnasal drip, rhinorrhea, sinus pressure, sneezing, sore throat and voice change.    Eyes:  Negative for discharge, itching and visual disturbance.   Respiratory:  Negative for cough, shortness of breath and wheezing.    Cardiovascular:  Negative for chest pain, palpitations and leg swelling.   Gastrointestinal:   Negative for abdominal pain, nausea and vomiting.   Endocrine: Negative for polydipsia, polyphagia and polyuria.   Genitourinary:  Negative for difficulty urinating, dysuria, frequency, hematuria, penile discharge, penile pain, penile swelling and urgency.   Musculoskeletal:  Negative for arthralgias and myalgias.   Skin:  Positive for wound. Negative for rash.   Neurological:  Negative for dizziness, seizures, syncope and weakness.   Hematological:  Negative for adenopathy. Does not bruise/bleed easily.   Psychiatric/Behavioral:  Negative for agitation and self-injury. The patient is not nervous/anxious.        Physical Exam     Initial Vitals [09/24/23 1026]   BP Pulse Resp Temp SpO2   (!) 157/90 86 16 97.5 °F (36.4 °C) 97 %      MAP       --         Physical Exam    Nursing note and vitals reviewed.  Constitutional: He appears well-developed and well-nourished. He is not diaphoretic. No distress.   HENT:   Head: Normocephalic and atraumatic.   Right Ear: External ear normal.   Left Ear: External ear normal.   Nose: Nose normal.   Eyes: Pupils are equal, round, and reactive to light. Right eye exhibits no discharge. Left eye exhibits no discharge. No scleral icterus.   Neck:   Normal range of motion.  Pulmonary/Chest: No respiratory distress.   Abdominal: He exhibits no distension.   Musculoskeletal:         General: Normal range of motion.      Cervical back: Normal range of motion.      Comments: There is an irregularly shaped wound to the posterior of the right ankle with significant odor, serous sanguinous drainage and generalized edema from toes to knee the entire area is quite tender.  Distal P SM is intact.     Neurological: He is alert and oriented to person, place, and time.   Skin: Skin is dry. Capillary refill takes less than 2 seconds.         ED Course   Critical Care    Date/Time: 9/24/2023 2:39 PM    Performed by: Ronny Kong DNP  Authorized by: Sophia Deleon MD  Direct patient  critical care time: 5 minutes  Additional history critical care time: 5 minutes  Ordering / reviewing critical care time: 5 minutes  Documentation critical care time: 5 minutes  Consulting other physicians critical care time: 5 minutes  Consult with family critical care time: 5 minutes  Other critical care time: 0 minutes  Total critical care time (exclusive of procedural time) : 30 minutes  Critical care time was exclusive of separately billable procedures and treating other patients and teaching time.  Critical care was necessary to treat or prevent imminent or life-threatening deterioration of the following conditions: cardiac failure, circulatory failure, sepsis, shock and toxidrome.  Critical care was time spent personally by me on the following activities: discussions with consultants, discussions with primary provider, development of treatment plan with patient or surrogate, evaluation of patient's response to treatment, examination of patient, obtaining history from patient or surrogate, ordering and performing treatments and interventions, ordering and review of laboratory studies, ordering and review of radiographic studies and review of old charts.        Labs Reviewed   CBC W/ AUTO DIFFERENTIAL - Abnormal; Notable for the following components:       Result Value    RBC 4.52 (*)     MPV 9.0 (*)     All other components within normal limits   CULTURE, BLOOD   CULTURE, BLOOD   CULTURE, AEROBIC  (SPECIFY SOURCE)   COMPREHENSIVE METABOLIC PANEL   POCT GLUCOSE MONITORING CONTINUOUS          Imaging Results              US Lower Extremity Veins Right (Final result)  Result time 09/24/23 13:26:33      Final result by Hector Underwood MD (09/24/23 13:26:33)                   Impression:      Nonocclusive DVT in the femoral and popliteal veins with collateral vessel formation, which may be chronic.    Solitary peroneal and also posterior tibial veins are identified and appear patent, noting thrombosis of a  nonvisualized potential paired vein not excluded on the basis of this examination.      Electronically signed by: Hector Underwood MD  Date:    09/24/2023  Time:    13:26               Narrative:    EXAMINATION:  US LOWER EXTREMITY VEINS RIGHT    CLINICAL HISTORY:  Other specified soft tissue disorders    TECHNIQUE:  Duplex and color flow Doppler evaluation and graded compression of the right lower extremity veins was performed.    COMPARISON:  None    FINDINGS:  Right thigh veins: Nonocclusive thrombus seen within the femoral vein and popliteal vein with multiple collateral vessels also noted, presumably chronic.  Common femoral and greater saphenous veins appear patent with normal directional flow and waveforms.    Right calf veins: Paired anterior tibial veins are patent.  Solitary peroneal and also posterior tibial veins are identified and appear patent, which may be normal variant.    Contralateral CFV: The contralateral (left) common femoral vein is patent and free of thrombus.    Miscellaneous: None                                       X-Ray Ankle Complete Right (Final result)  Result time 09/24/23 12:00:59      Final result by Stacy Tierney MD (09/24/23 12:00:59)                   Impression:      As above.      Electronically signed by: Stacy Tierney MD  Date:    09/24/2023  Time:    12:00               Narrative:    EXAMINATION:  XR ANKLE COMPLETE 3 VIEW RIGHT    CLINICAL HISTORY:  Cellulitis of right lower limb    TECHNIQUE:  AP, lateral, and oblique images of the right ankle were performed.    COMPARISON:  None    FINDINGS:  No acute fracture or bony destructive process is seen.  Alignment is preserved.  There is soft tissue swelling about the ankle.  Overlying bandage material obscures detail somewhat.  There is irregularity of the skin surface posterior to the distal tibia and fibula; it would be difficult to exclude a skin defect or open wound at this site.  No definite air is seen in the soft  tissues, but this could be obscured by the bandage material.                                       Medications   vancomycin - pharmacy to dose (has no administration in time range)   vancomycin (VANCOCIN) 1,750 mg in dextrose 5 % (D5W) 500 mL IVPB (1,750 mg Intravenous New Bag 9/24/23 1331)   vancomycin 1,500 mg in dextrose 5 % (D5W) 250 mL IVPB (Vial-Mate) (1,500 mg Intravenous Trough Due As Scheduled Before Dose 9/26/23 0030)   enoxaparin injection 80 mg (has no administration in time range)   HYDROmorphone injection 1 mg (1 mg Intravenous Given 9/24/23 1128)   ondansetron injection 4 mg (4 mg Intravenous Given 9/24/23 1127)   piperacillin-tazobactam (ZOSYN) 4.5 g in dextrose 5 % in water (D5W) 100 mL IVPB (MB+) (0 g Intravenous Stopped 9/24/23 1208)   mupirocin 2 % ointment 22 g (22 g Topical (Top) Given 9/24/23 1135)   HYDROmorphone injection 1 mg (1 mg Intravenous Given 9/24/23 1155)     Medical Decision Making  43-year-old male presents the emergency department for a irregularly shaped wound to the rear of the ankle which has been present for approximately 3 months he has been on multiple antibiotic regimens without relief or change in the wound he is had a wound care referral but has not been to wound care.  During my initial exam pt was aggressive and hostile, he answered questions in short clipped answers.  I had to explain that I was present to help him and I couldn't do that without his cooperation before he finally began to answer questions appropriately and fully.  He reports the pain regimens provided have been ineffective.  On examination the wound is draining serosanguineous fluid there is significant odor and edema.  Differential diagnosis includes cellulitis osteomyelitis chronic wound, wound infection.    Problems Addressed:  Cellulitis of right ankle: chronic illness or injury  Chronic deep vein thrombosis (DVT) of femoral vein of right lower extremity: chronic illness or injury  Chronic deep  vein thrombosis (DVT) of popliteal vein of right lower extremity: chronic illness or injury  Failure of outpatient treatment: acute illness or injury  Right leg swelling: acute illness or injury    Amount and/or Complexity of Data Reviewed  Labs: ordered. Decision-making details documented in ED Course.  Radiology: ordered. Decision-making details documented in ED Course.  Discussion of management or test interpretation with external provider(s): SBAR given to Maria Teresa Deleon and Candelario.  Dr. Palomino accepted pt for admission, pt is amenable to this.      Risk  Prescription drug management.  Decision regarding hospitalization.  Diagnosis or treatment significantly limited by social determinants of health.               ED Course as of 09/24/23 1440   Sun Sep 24, 2023   1112 BP(!): 157/90 [VC]   1112 Temp: 97.5 °F (36.4 °C) [VC]   1112 Temp Source: Oral [VC]   1112 Pulse: 86 [VC]   1112 Resp: 16 [VC]   1112 SpO2: 97 % [VC]   1209 X-Ray Ankle Complete Right  No osteomyelitis [VC]   1209 Resp: 18 [VC]   1209 Resp: 16 [VC]   1218 Cbc with normal  wbc, h/h and platelet count. [VC]   1222 Normal cmp. [VC]   1333 CBC auto differential(!)  Normal cbc, normal cmp. [VC]   1333 US Lower Extremity Veins Right  Nonocclusive DVT in the femoral and popliteal veins with collateral vessel formation, which may be chronic.     Solitary peroneal and also posterior tibial veins are identified and appear patent, noting thrombosis of a nonvisualized potential paired vein not excluded on the basis of this examination. [VC]   1337 Vascular medicine paged. [VC]   1413 Dr. Palomino paged for admission.   [VC]      ED Course User Index  [VC] Ronny Kong, DNP                    Clinical Impression:   Final diagnoses:  [L03.115] Cellulitis of right ankle  [M79.89] Right leg swelling  [I82.511] Chronic deep vein thrombosis (DVT) of femoral vein of right lower extremity  [I82.531] Chronic deep vein thrombosis (DVT) of popliteal vein of right lower  extremity  [Z78.9] Failure of outpatient treatment (Primary)        ED Disposition Condition    Admit Stable                Ronny Kong, DIO  09/24/23 1441

## 2023-09-24 NOTE — ASSESSMENT & PLAN NOTE
Diagnosed with acute PE back in May of this year.  He was given Eliquis however he states he ran out and never followed up.  We will start back on loading dose of Eliquis and continue.  Appears unprovoked.

## 2023-09-24 NOTE — PROGRESS NOTES
"Pharmacokinetic Initial Assessment: IV Vancomycin    Assessment/Plan:    Initiate intravenous vancomycin with loading dose of 1750 mg once followed by a maintenance dose of vancomycin 1500 mg IV every 12 hours  Desired empiric serum trough concentration is 10 to 20 mcg/mL  Draw vancomycin trough level 60 min prior to fourth dose on 09/26 at approximately 0030  Pharmacy will continue to follow and monitor vancomycin.      Please contact pharmacy at extension [phone] with any questions regarding this assessment.     Thank you for the consult,   Lara Ann       Patient brief summary:  Jesse Fofana is a 43 y.o. male initiated on antimicrobial therapy with IV Vancomycin for treatment of suspected skin & soft tissue infection    Drug Allergies:   Review of patient's allergies indicates:  No Known Allergies    Actual Body Weight:   81.6 kg    Renal Function:   Estimated Creatinine Clearance: 160.3 mL/min (based on SCr of 0.7 mg/dL).,     Dialysis Method (if applicable):  N/A    CBC (last 72 hours):  Recent Labs   Lab Result Units 09/24/23  1121   WBC K/uL 8.98   Hemoglobin g/dL 14.0   Hematocrit % 41.9   Platelets K/uL 255   Gran % % 63.3   Lymph % % 28.1   Mono % % 6.2   Eosinophil % % 1.8   Basophil % % 0.4   Differential Method  Automated       Metabolic Panel (last 72 hours):  Recent Labs   Lab Result Units 09/24/23  1121   Sodium mmol/L 141   Potassium mmol/L 4.1   Chloride mmol/L 107   CO2 mmol/L 24   Glucose mg/dL 91   BUN mg/dL 8   Creatinine mg/dL 0.7   Albumin g/dL 3.5   Total Bilirubin mg/dL 0.2   Alkaline Phosphatase U/L 61   AST U/L 23   ALT U/L 13       Drug levels (last 3 results):  No results for input(s): "VANCOMYCINRA", "VANCORANDOM", "VANCOMYCINPE", "VANCOPEAK", "VANCOMYCINTR", "VANCOTROUGH" in the last 72 hours.    Microbiologic Results:  Microbiology Results (last 7 days)       Procedure Component Value Units Date/Time    Blood culture #2 **CANNOT BE ORDERED STAT** [[id number]] Collected: " 09/24/23 1121    Order Status: Sent Specimen: Blood from Peripheral, Antecubital, Right Updated: 09/24/23 1314    Blood culture #1 **CANNOT BE ORDERED STAT** [[id number]] Collected: 09/24/23 1121    Order Status: Sent Specimen: Blood from Peripheral, Upper Arm, Right Updated: 09/24/23 1314    Aerobic culture (Specify Source) **CANNOT BE ORDERED AS STAT** [[id number]]     Order Status: No result Specimen: Wound from Ankle, Right

## 2023-09-24 NOTE — HPI
Mr. Fofana is a 44 yo M with PMHx of PE, alcohol use who presents to the ED for evaluation of right ankle wound. He initially visited the ED on 7/26 for the wound and was prescribed bactrim, he only took 1 pill after that time. He then returned to ED on 8/16 where he was given a prescription for Ciprofloxacin/Doyxycline and then again on 9/2 where he was given 1 dose of Vanc/Zosyn then PO prescription for Bactrim.  Cultures were drawn on 09/02 and growing MSSA and Aceinetobacer lwoffi group .  Patient was called however no answer so he was never able to be notified.  He comes back today with ongoing drainage and swelling as well as pain. He was shocked to hear his blood cultures were positive the last stay and is in agreement with admission for IV antibiotics. He reports not taking his eliquis recently since admitted in May of this year for acute PE. He denies any fevers, chills or other wounds. He will be admitted to hospital medicine for further management.

## 2023-09-24 NOTE — ASSESSMENT & PLAN NOTE
During ED evaluation on 09/02 of this year, blood cultures were drawn and were positive for MSSA and Acinetobacter.  He is not had appropriate treatment.  Recheck blood cultures this admission.  Check echocardiogram  Consult Infectious Disease

## 2023-09-24 NOTE — PLAN OF CARE
[facility] - Emergency Dept  Initial Discharge Assessment       Primary Care Provider: No, Primary Doctor    Admission Diagnosis: Failure of outpatient treatment [Z78.9]    Admission Date: 9/24/2023  Expected Discharge Date:     Transition of Care Barriers: Other (see comments) (hx of alcohol abuse;  currently residing in a hotel.)    Payor: MEDICAID / Plan: [place] [employer] / Product Type: Managed Medicaid /     Extended Emergency Contact Information  Primary Emergency Contact: Ayana Ayala   [place]  Mobile Phone: [phone]  Relation: Significant other    Discharge Plan A: Home  Discharge Plan B: Other (tbd)      [id number] - [place], [place] - [address] [place] [address] [address]PY AT [facility] & [address]  [place] [address]  Phone: [phone] Fax: [phone]      Initial Assessment (most recent)       Adult Discharge Assessment - 09/24/23 1504          Discharge Assessment    Assessment Type Discharge Planning Assessment     Confirmed/corrected address, phone number and insurance Yes   See temporary address.    Source of Information patient     Reason For Admission Cellulitis of right ankle  (M79.89) Right leg swelling  (I82.511) Chronic deep vein thrombosis (DVT) of femoral vein of right lower extremity  (I82.531) Chronic deep vein thrombosis (DVT) of popliteal vein of right lower extremity  (Z78.9) Failure of outpatient treatment (Primary)     People in Home sibling(s)     Facility Arrived From: Home     Do you expect to return to your current living situation? Yes     Prior to hospitilization cognitive status: Alert/Oriented     Current cognitive status: Alert/Oriented     Equipment Currently Used at Home none     Patient currently being followed by outpatient case management? No     Do you currently have service(s) that help you manage your care at home? No     Do you take prescription medications? Yes     Do you have prescription coverage? Yes     Coverage [employer] Medicaid     Do you have any problems affording any of your prescribed medications? No     Who is going to help you get home at discharge? Will drive self home     How do you get to doctors appointments? car, drives self     Are you on dialysis? No     Do you take coumadin? No     DME Needed Upon Discharge  none     Discharge Plan discussed with: Patient     Transition of Care Barriers Other (see comments)   hx of alcohol abuse;  currently residing in a hotel.    Discharge Plan A Home     Discharge Plan B Other   tbd                Case Management Assessment     PCP: None  Pharmacy: [facility]    Patient Arrived From: Home (living in a hotel)  Existing Help at Home: TBD    Barriers to Discharge: hx of alcohol abuse; living in a hotel.      Discharge Plan:    A. Home   B. TBD       Patient initially stated that he was living with his sister at the [place] address.  When asked if he was visiting this area or living here, he stated that he was living at [address] , [address], in [place].  Patient confirmed that he is living a a hotel.  When medically stable he will discharge to home ( or hotel in [place]).  Failed OP Treatment.

## 2023-09-24 NOTE — NURSING
Notified Dr Palomino that the patient arrived to the floor.  Refusing to have the dressing removed for the assessment of the wound.

## 2023-09-24 NOTE — NURSING
[facility]  Nurses Note -- 4 Eyes      9/24/2023       Skin assessed on: Admit      [x] No Pressure Injuries Present    [x]Prevention Measures Documented    [] Yes LDA  for Pressure Injury Previously documented     [] Yes New Pressure Injury Discovered   [] LDA for New Pressure Injury Added      Attending RN:  Aurelia Jay, RN     Second RN:  Sulema    Patient does have wound under right leg dressing.  Will not let nursing remove dressing to assess

## 2023-09-24 NOTE — SUBJECTIVE & OBJECTIVE
No past medical history on file.    No past surgical history on file.    Review of patient's allergies indicates:  No Known Allergies    No current facility-administered medications on file prior to encounter.     Current Outpatient Medications on File Prior to Encounter   Medication Sig    famotidine (PEPCID) 20 MG tablet Take 1 tablet (20 mg total) by mouth 2 (two) times daily.    meloxicam (MOBIC) 15 MG tablet Take 1 tablet (15 mg total) by mouth once daily.    traMADoL (ULTRAM) 50 mg tablet Take 1 tablet (50 mg total) by mouth every 4 (four) hours as needed for Pain.    ibuprofen (ADVIL,MOTRIN) 600 MG tablet Take 1 tablet (600 mg total) by mouth every 6 (six) hours as needed.     Family History    None       Tobacco Use    Smoking status: Never    Smokeless tobacco: Never   Substance and Sexual Activity    Alcohol use: Yes     Alcohol/week: 3.0 - 4.0 standard drinks of alcohol     Types: 3 - 4 Cans of beer per week     Comment: daily drinker    Drug use: Not Currently    Sexual activity: Not on file     Review of Systems   Constitutional:  Negative for chills and fever.   Respiratory:  Negative for cough and shortness of breath.      Objective:     Vital Signs (Most Recent):  Temp: 98 °F (36.7 °C) (09/24/23 1450)  Pulse: 65 (09/24/23 1450)  Resp: 18 (09/24/23 1450)  BP: (!) 161/93 (09/24/23 1450)  SpO2: 99 % (09/24/23 1450) Vital Signs (24h Range):  Temp:  [97.5 °F (36.4 °C)-98 °F (36.7 °C)] 98 °F (36.7 °C)  Pulse:  [65-86] 65  Resp:  [16-18] 18  SpO2:  [97 %-99 %] 99 %  BP: (157-161)/(90-93) 161/93     Weight: 81.6 kg (180 lb)  Body mass index is 22.5 kg/m².     Physical Exam  Vitals and nursing note reviewed.   Constitutional:       Appearance: He is not ill-appearing.   Cardiovascular:      Rate and Rhythm: Normal rate and regular rhythm.      Pulses: Normal pulses.      Heart sounds: No murmur heard.  Pulmonary:      Effort: Pulmonary effort is normal. No respiratory distress.   Abdominal:      General:  Bowel sounds are normal. There is no distension.      Tenderness: There is no abdominal tenderness.   Musculoskeletal:      Comments: Right lower extremity wrapped with gauze/bandaged  -    Neurological:      General: No focal deficit present.      Mental Status: He is alert and oriented to person, place, and time.   Psychiatric:         Mood and Affect: Mood normal.         Thought Content: Thought content normal.                  Significant Labs: All pertinent labs within the past 24 hours have been reviewed.    Significant Imaging: I have reviewed all pertinent imaging results/findings within the past 24 hours.

## 2023-09-24 NOTE — H&P
[facility] - [place] [facility]  [facility] Medicine  History & Physical    Patient Name: Jesse Fofana  MRN: [id number]  Patient Class: IP- Inpatient  Admission Date: 9/24/2023  Attending Physician: Phong Palomino MD   Primary Care Provider: Denisha Primary Doctor         Patient information was obtained from patient, past medical records and ER records.     Subjective:     Principal Problem:Cellulitis of right lower extremity    Chief Complaint:   Chief Complaint   Patient presents with    Ankle Pain     Reports abscess to right ankle x's 3 wks. Reports was seen and given ibuprofen. Denies fever.         HPI: Mr. Fofana is a 42 yo M with PMHx of PE, alcohol use who presents to the ED for evaluation of right ankle wound. He initially visited the ED on 7/26 for the wound and was prescribed bactrim, he only took 1 pill after that time. He then returned to ED on 8/16 where he was given a prescription for Ciprofloxacin/Doyxycline and then again on 9/2 where he was given 1 dose of Vanc/Zosyn then PO prescription for Bactrim.  Cultures were drawn on 09/02 and growing MSSA and Aceinetobacer lwoffi group .  Patient was called however no answer so he was never able to be notified.  He comes back today with ongoing drainage and swelling as well as pain. He was shocked to hear his blood cultures were positive the last stay and is in agreement with admission for IV antibiotics. He reports not taking his eliquis recently since admitted in May of this year for acute PE. He denies any fevers, chills or other wounds. He will be admitted to hospital medicine for further management.      No past medical history on file.    No past surgical history on file.    Review of patient's allergies indicates:  No Known Allergies    No current facility-administered medications on file prior to encounter.     Current Outpatient Medications on File Prior to Encounter   Medication Sig    famotidine (PEPCID) 20 MG tablet Take 1 tablet (20 mg total)  by mouth 2 (two) times daily.    meloxicam (MOBIC) 15 MG tablet Take 1 tablet (15 mg total) by mouth once daily.    traMADoL (ULTRAM) 50 mg tablet Take 1 tablet (50 mg total) by mouth every 4 (four) hours as needed for Pain.    ibuprofen (ADVIL,MOTRIN) 600 MG tablet Take 1 tablet (600 mg total) by mouth every 6 (six) hours as needed.     Family History    None       Tobacco Use    Smoking status: Never    Smokeless tobacco: Never   Substance and Sexual Activity    Alcohol use: Yes     Alcohol/week: 3.0 - 4.0 standard drinks of alcohol     Types: 3 - 4 Cans of beer per week     Comment: daily drinker    Drug use: Not Currently    Sexual activity: Not on file     Review of Systems   Constitutional:  Negative for chills and fever.   Respiratory:  Negative for cough and shortness of breath.      Objective:     Vital Signs (Most Recent):  Temp: 98 °F (36.7 °C) (09/24/23 1450)  Pulse: 65 (09/24/23 1450)  Resp: 18 (09/24/23 1450)  BP: (!) 161/93 (09/24/23 1450)  SpO2: 99 % (09/24/23 1450) Vital Signs (24h Range):  Temp:  [97.5 °F (36.4 °C)-98 °F (36.7 °C)] 98 °F (36.7 °C)  Pulse:  [65-86] 65  Resp:  [16-18] 18  SpO2:  [97 %-99 %] 99 %  BP: (157-161)/(90-93) 161/93     Weight: 81.6 kg (180 lb)  Body mass index is 22.5 kg/m².     Physical Exam  Vitals and nursing note reviewed.   Constitutional:       Appearance: He is not ill-appearing.   Cardiovascular:      Rate and Rhythm: Normal rate and regular rhythm.      Pulses: Normal pulses.      Heart sounds: No murmur heard.  Pulmonary:      Effort: Pulmonary effort is normal. No respiratory distress.   Abdominal:      General: Bowel sounds are normal. There is no distension.      Tenderness: There is no abdominal tenderness.   Musculoskeletal:      Comments: Right lower extremity wrapped with gauze/bandaged  -    Neurological:      General: No focal deficit present.      Mental Status: He is alert and oriented to person, place, and time.   Psychiatric:         Mood and  Affect: Mood normal.         Thought Content: Thought content normal.                  Significant Labs: All pertinent labs within the past 24 hours have been reviewed.    Significant Imaging: I have reviewed all pertinent imaging results/findings within the past 24 hours.    Assessment/Plan:     * Cellulitis of right lower extremity  Presents with unhealing cellulitis to the right lower extremity.  Has been prescribed Bactrim, doxycycline and ciprofloxacin in the past with no improvement.  Unclear if he was actually taking these medications though.   - IV zosyn/vanc and de-escalate  - wound culture ordered  - xray with no joint involvement  - continue with supportive care      Bacteremia  During ED evaluation on 09/02 of this year, blood cultures were drawn and were positive for MSSA and Acinetobacter.  He is not had appropriate treatment.  Recheck blood cultures this admission.  Check echocardiogram  Consult Infectious Disease      History of pulmonary embolus (PE)  Diagnosed with acute PE back in May of this year.  He was given Eliquis however he states he ran out and never followed up.  We will start back on loading dose of Eliquis and continue.  Appears unprovoked.      Tobacco use  Nicotine patch offered inpatient refused at this time.        VTE Risk Mitigation (From admission, onward)         Ordered     apixaban tablet 10 mg  2 times daily         09/24/23 6192                           Phong Palomino MD  Department of Hospital Medicine  [facility] - Emergency Dept

## 2023-09-24 NOTE — PHARMACY MED REC
"Admission Medication History     The home medication history was taken by Vickie Kemp.    You may go to "Admission" then "Reconcile Home Medications" tabs to review and/or act upon these items.     The home medication list has been updated by the Pharmacy department.   Please read ALL comments highlighted in yellow.   Please address this information as you see fit.    Feel free to contact us if you have any questions or require assistance.      The medications listed below were removed from the home medication list. Please reorder if appropriate:  Patient reports no longer taking the following medication(s):  Ibuprofen    Medications listed below were obtained from: Patient/family and Analytic software- [employer]    The medication reconciliation was completed by the patient's bedside.      Vickie Kemp  [phone]              .          "

## 2023-09-25 LAB
ALBUMIN SERPL BCP-MCNC: 3.1 G/DL (ref 3.5–5.2)
ALP SERPL-CCNC: 59 U/L (ref 55–135)
ALT SERPL W/O P-5'-P-CCNC: 9 U/L (ref 10–44)
ANION GAP SERPL CALC-SCNC: 8 MMOL/L (ref 8–16)
ASCENDING AORTA: 3.1 CM
AST SERPL-CCNC: 16 U/L (ref 10–40)
AV INDEX (PROSTH): 0.91
AV MEAN GRADIENT: 6 MMHG
AV PEAK GRADIENT: 9 MMHG
AV VALVE AREA BY VELOCITY RATIO: 3.77 CM²
AV VALVE AREA: 3.54 CM²
AV VELOCITY RATIO: 0.97
BASOPHILS # BLD AUTO: 0.02 K/UL (ref 0–0.2)
BASOPHILS NFR BLD: 0.3 % (ref 0–1.9)
BILIRUB SERPL-MCNC: 0.6 MG/DL (ref 0.1–1)
BSA FOR ECHO PROCEDURE: 2.06 M2
BUN SERPL-MCNC: 9 MG/DL (ref 6–20)
CALCIUM SERPL-MCNC: 8.7 MG/DL (ref 8.7–10.5)
CHLORIDE SERPL-SCNC: 103 MMOL/L (ref 95–110)
CO2 SERPL-SCNC: 27 MMOL/L (ref 23–29)
CREAT SERPL-MCNC: 0.8 MG/DL (ref 0.5–1.4)
CV ECHO LV RWT: 0.46 CM
DIFFERENTIAL METHOD: ABNORMAL
DOP CALC AO PEAK VEL: 1.47 M/S
DOP CALC AO VTI: 31.2 CM
DOP CALC LVOT AREA: 3.9 CM2
DOP CALC LVOT DIAMETER: 2.23 CM
DOP CALC LVOT PEAK VEL: 1.42 M/S
DOP CALC LVOT STROKE VOLUME: 110.48 CM3
DOP CALC MV VTI: 30.8 CM
DOP CALCLVOT PEAK VEL VTI: 28.3 CM
E WAVE DECELERATION TIME: 236.25 MSEC
E/A RATIO: 2
E/E' RATIO: 7.84 M/S
ECHO LV POSTERIOR WALL: 1.06 CM (ref 0.6–1.1)
EOSINOPHIL # BLD AUTO: 0.1 K/UL (ref 0–0.5)
EOSINOPHIL NFR BLD: 0.8 % (ref 0–8)
ERYTHROCYTE [DISTWIDTH] IN BLOOD BY AUTOMATED COUNT: 13.7 % (ref 11.5–14.5)
EST. GFR  (NO RACE VARIABLE): >60 ML/MIN/1.73 M^2
FRACTIONAL SHORTENING: 34 % (ref 28–44)
GLUCOSE SERPL-MCNC: 111 MG/DL (ref 70–110)
HCT VFR BLD AUTO: 43 % (ref 40–54)
HGB BLD-MCNC: 14.1 G/DL (ref 14–18)
IMM GRANULOCYTES # BLD AUTO: 0.03 K/UL (ref 0–0.04)
IMM GRANULOCYTES NFR BLD AUTO: 0.4 % (ref 0–0.5)
INTERVENTRICULAR SEPTUM: 0.99 CM (ref 0.6–1.1)
IVC DIAMETER: 2.13 CM
IVRT: 95.15 MSEC
LA MAJOR: 4.98 CM
LA MINOR: 4.93 CM
LA WIDTH: 3.9 CM
LEFT ATRIUM SIZE: 2.74 CM
LEFT ATRIUM VOLUME INDEX: 21.6 ML/M2
LEFT ATRIUM VOLUME: 45.01 CM3
LEFT INTERNAL DIMENSION IN SYSTOLE: 3.03 CM (ref 2.1–4)
LEFT VENTRICLE DIASTOLIC VOLUME INDEX: 46.19 ML/M2
LEFT VENTRICLE DIASTOLIC VOLUME: 96.07 ML
LEFT VENTRICLE MASS INDEX: 78 G/M2
LEFT VENTRICLE SYSTOLIC VOLUME INDEX: 17.3 ML/M2
LEFT VENTRICLE SYSTOLIC VOLUME: 35.99 ML
LEFT VENTRICULAR INTERNAL DIMENSION IN DIASTOLE: 4.57 CM (ref 3.5–6)
LEFT VENTRICULAR MASS: 162.57 G
LV LATERAL E/E' RATIO: 7 M/S
LV SEPTAL E/E' RATIO: 8.91 M/S
LVOT MG: 4.07 MMHG
LVOT MV: 0.93 CM/S
LYMPHOCYTES # BLD AUTO: 1.3 K/UL (ref 1–4.8)
LYMPHOCYTES NFR BLD: 16.9 % (ref 18–48)
MAGNESIUM SERPL-MCNC: 2 MG/DL (ref 1.6–2.6)
MCH RBC QN AUTO: 30.5 PG (ref 27–31)
MCHC RBC AUTO-ENTMCNC: 32.8 G/DL (ref 32–36)
MCV RBC AUTO: 93 FL (ref 82–98)
MONOCYTES # BLD AUTO: 0.4 K/UL (ref 0.3–1)
MONOCYTES NFR BLD: 5.1 % (ref 4–15)
MV MEAN GRADIENT: 2 MMHG
MV PEAK A VEL: 0.49 M/S
MV PEAK E VEL: 0.98 M/S
MV PEAK GRADIENT: 4 MMHG
MV STENOSIS PRESSURE HALF TIME: 68.51 MS
MV VALVE AREA BY CONTINUITY EQUATION: 3.59 CM2
MV VALVE AREA P 1/2 METHOD: 3.21 CM2
NEUTROPHILS # BLD AUTO: 6 K/UL (ref 1.8–7.7)
NEUTROPHILS NFR BLD: 76.5 % (ref 38–73)
NRBC BLD-RTO: 0 /100 WBC
PHOSPHATE SERPL-MCNC: 2.4 MG/DL (ref 2.7–4.5)
PISA TR MAX VEL: 2.21 M/S
PLATELET # BLD AUTO: 258 K/UL (ref 150–450)
PMV BLD AUTO: 9.1 FL (ref 9.2–12.9)
POTASSIUM SERPL-SCNC: 4.1 MMOL/L (ref 3.5–5.1)
PROT SERPL-MCNC: 7.2 G/DL (ref 6–8.4)
PV PEAK GRADIENT: 4 MMHG
PV PEAK VELOCITY: 1.06 M/S
RA MAJOR: 5.28 CM
RA PRESSURE ESTIMATED: 3 MMHG
RA WIDTH: 4.7 CM
RBC # BLD AUTO: 4.63 M/UL (ref 4.6–6.2)
RIGHT VENTRICULAR END-DIASTOLIC DIMENSION: 3.94 CM
RV TB RVSP: 5 MMHG
RV TISSUE DOPPLER FREE WALL SYSTOLIC VELOCITY 1 (APICAL 4 CHAMBER VIEW): 15.58 CM/S
SINUS: 3.09 CM
SODIUM SERPL-SCNC: 138 MMOL/L (ref 136–145)
STJ: 2.38 CM
TDI LATERAL: 0.14 M/S
TDI SEPTAL: 0.11 M/S
TDI: 0.13 M/S
TR MAX PG: 20 MMHG
TRICUSPID ANNULAR PLANE SYSTOLIC EXCURSION: 1.78 CM
TV REST PULMONARY ARTERY PRESSURE: 23 MMHG
WBC # BLD AUTO: 7.8 K/UL (ref 3.9–12.7)
Z-SCORE OF LEFT VENTRICULAR DIMENSION IN END DIASTOLE: -3.32
Z-SCORE OF LEFT VENTRICULAR DIMENSION IN END SYSTOLE: -1.99

## 2023-09-25 PROCEDURE — 99222 PR INITIAL HOSPITAL CARE,LEVL II: ICD-10-PCS | Mod: ,,, | Performed by: INTERNAL MEDICINE

## 2023-09-25 PROCEDURE — 99222 1ST HOSP IP/OBS MODERATE 55: CPT | Mod: ,,, | Performed by: INTERNAL MEDICINE

## 2023-09-25 PROCEDURE — 36415 COLL VENOUS BLD VENIPUNCTURE: CPT | Performed by: STUDENT IN AN ORGANIZED HEALTH CARE EDUCATION/TRAINING PROGRAM

## 2023-09-25 PROCEDURE — 84100 ASSAY OF PHOSPHORUS: CPT | Performed by: STUDENT IN AN ORGANIZED HEALTH CARE EDUCATION/TRAINING PROGRAM

## 2023-09-25 PROCEDURE — 25000003 PHARM REV CODE 250: Performed by: STUDENT IN AN ORGANIZED HEALTH CARE EDUCATION/TRAINING PROGRAM

## 2023-09-25 PROCEDURE — 63600175 PHARM REV CODE 636 W HCPCS: Performed by: STUDENT IN AN ORGANIZED HEALTH CARE EDUCATION/TRAINING PROGRAM

## 2023-09-25 PROCEDURE — 99222 1ST HOSP IP/OBS MODERATE 55: CPT | Mod: ,,, | Performed by: SURGERY

## 2023-09-25 PROCEDURE — 25000003 PHARM REV CODE 250: Performed by: INTERNAL MEDICINE

## 2023-09-25 PROCEDURE — 83735 ASSAY OF MAGNESIUM: CPT | Performed by: STUDENT IN AN ORGANIZED HEALTH CARE EDUCATION/TRAINING PROGRAM

## 2023-09-25 PROCEDURE — 80053 COMPREHEN METABOLIC PANEL: CPT | Performed by: STUDENT IN AN ORGANIZED HEALTH CARE EDUCATION/TRAINING PROGRAM

## 2023-09-25 PROCEDURE — 63600175 PHARM REV CODE 636 W HCPCS: Performed by: INTERNAL MEDICINE

## 2023-09-25 PROCEDURE — 11000001 HC ACUTE MED/SURG PRIVATE ROOM

## 2023-09-25 PROCEDURE — 85025 COMPLETE CBC W/AUTO DIFF WBC: CPT | Performed by: STUDENT IN AN ORGANIZED HEALTH CARE EDUCATION/TRAINING PROGRAM

## 2023-09-25 PROCEDURE — 99222 PR INITIAL HOSPITAL CARE,LEVL II: ICD-10-PCS | Mod: ,,, | Performed by: SURGERY

## 2023-09-25 RX ORDER — CIPROFLOXACIN 2 MG/ML
400 INJECTION, SOLUTION INTRAVENOUS
Status: DISCONTINUED | OUTPATIENT
Start: 2023-09-25 | End: 2023-09-25

## 2023-09-25 RX ADMIN — HYDROCODONE BITARTRATE AND ACETAMINOPHEN 1 TABLET: 5; 325 TABLET ORAL at 05:09

## 2023-09-25 RX ADMIN — VANCOMYCIN HYDROCHLORIDE 1500 MG: 1.5 INJECTION, POWDER, LYOPHILIZED, FOR SOLUTION INTRAVENOUS at 12:09

## 2023-09-25 RX ADMIN — CEFEPIME 2 G: 2 INJECTION, POWDER, FOR SOLUTION INTRAVENOUS at 10:09

## 2023-09-25 RX ADMIN — HYDROCODONE BITARTRATE AND ACETAMINOPHEN 1 TABLET: 5; 325 TABLET ORAL at 06:09

## 2023-09-25 RX ADMIN — MORPHINE SULFATE 4 MG: 4 INJECTION, SOLUTION INTRAMUSCULAR; INTRAVENOUS at 08:09

## 2023-09-25 RX ADMIN — APIXABAN 10 MG: 5 TABLET, FILM COATED ORAL at 08:09

## 2023-09-25 RX ADMIN — HYDROCODONE BITARTRATE AND ACETAMINOPHEN 1 TABLET: 5; 325 TABLET ORAL at 12:09

## 2023-09-25 RX ADMIN — PIPERACILLIN AND TAZOBACTAM 4.5 G: 4; .5 INJECTION, POWDER, LYOPHILIZED, FOR SOLUTION INTRAVENOUS; PARENTERAL at 04:09

## 2023-09-25 RX ADMIN — MORPHINE SULFATE 4 MG: 4 INJECTION, SOLUTION INTRAMUSCULAR; INTRAVENOUS at 04:09

## 2023-09-25 RX ADMIN — CIPROFLOXACIN 400 MG: 2 INJECTION, SOLUTION INTRAVENOUS at 09:09

## 2023-09-25 RX ADMIN — CEFEPIME 2 G: 2 INJECTION, POWDER, FOR SOLUTION INTRAVENOUS at 02:09

## 2023-09-25 RX ADMIN — VANCOMYCIN HYDROCHLORIDE 1500 MG: 1.5 INJECTION, POWDER, LYOPHILIZED, FOR SOLUTION INTRAVENOUS at 03:09

## 2023-09-25 NOTE — NURSING
Brought patient gown, directed multiple times to put gown on.  Scars also noted to bilat legs.  Difficulty assessing because of the jeans.  Reinforced importance of skin assessment.     Clear

## 2023-09-25 NOTE — SUBJECTIVE & OBJECTIVE
History reviewed. No pertinent past medical history.    History reviewed. No pertinent surgical history.    Review of patient's allergies indicates:  No Known Allergies    No current facility-administered medications on file prior to encounter.     Current Outpatient Medications on File Prior to Encounter   Medication Sig    famotidine (PEPCID) 20 MG tablet Take 1 tablet (20 mg total) by mouth 2 (two) times daily.    meloxicam (MOBIC) 15 MG tablet Take 1 tablet (15 mg total) by mouth once daily.    traMADoL (ULTRAM) 50 mg tablet Take 1 tablet (50 mg total) by mouth every 4 (four) hours as needed for Pain.    ibuprofen (ADVIL,MOTRIN) 600 MG tablet Take 1 tablet (600 mg total) by mouth every 6 (six) hours as needed.     Family History    None       Tobacco Use    Smoking status: Every Day     Current packs/day: 1.00     Average packs/day: 1 pack/day for 25.0 years (25.0 ttl pk-yrs)     Types: Cigarettes     Start date: 9/24/1998    Smokeless tobacco: Never   Substance and Sexual Activity    Alcohol use: Yes     Alcohol/week: 3.0 - 4.0 standard drinks of alcohol     Types: 3 - 4 Cans of beer per week     Comment: daily drinker    Drug use: Not Currently    Sexual activity: Not on file     Review of Systems   Constitutional:  Negative for chills and fever.   Respiratory:  Negative for cough and shortness of breath.      Objective:     Vital Signs (Most Recent):  Temp: 97.8 °F (36.6 °C) (09/25/23 1116)  Pulse: (!) 59 (09/25/23 1116)  Resp: 18 (09/25/23 1240)  BP: (!) 147/90 (09/25/23 1116)  SpO2: 99 % (09/25/23 1116) Vital Signs (24h Range):  Temp:  [97.8 °F (36.6 °C)-99.3 °F (37.4 °C)] 97.8 °F (36.6 °C)  Pulse:  [59-81] 59  Resp:  [15-20] 18  SpO2:  [96 %-100 %] 99 %  BP: (137-161)/() 147/90     Weight: 80.1 kg (176 lb 9.4 oz)  Body mass index is 22.07 kg/m².     Physical Exam  Vitals and nursing note reviewed.   Constitutional:       Appearance: He is not ill-appearing.   HENT:      Head: Normocephalic and  atraumatic.   Cardiovascular:      Rate and Rhythm: Normal rate and regular rhythm.      Pulses: Normal pulses.      Heart sounds: No murmur heard.  Pulmonary:      Effort: Pulmonary effort is normal. No respiratory distress.   Abdominal:      General: Bowel sounds are normal. There is no distension.      Tenderness: There is no abdominal tenderness.   Musculoskeletal:      Comments: Right lower extremity wrapped with gauze/bandaged  -    Skin:     Comments: R leg warm. Copious drainage from R lower leg, foul smelling and very tender. Palpable fluid collection   Neurological:      General: No focal deficit present.      Mental Status: He is alert and oriented to person, place, and time.   Psychiatric:         Mood and Affect: Mood normal.         Thought Content: Thought content normal.                  Significant Labs: All pertinent labs within the past 24 hours have been reviewed.    Significant Imaging: I have reviewed all pertinent imaging results/findings within the past 24 hours.

## 2023-09-25 NOTE — ASSESSMENT & PLAN NOTE
-During ED evaluation on 09/02 of this year, blood cultures were drawn and were positive for MSSA and Acinetobacter.  He is not had appropriate treatment.  Recheck blood cultures this admission.  -Check echocardiogram: no vegetations   -MRI right ankle: No evidence for acute osteomyelitis at this time. Mixed edema and STIR heterogeneity of deep and superficial soft tissue structures at the ankle.  -Suspect R lower leg wound source of bacteremia.   -ID consulted: anticipating 4 weeks of IV antibiotics on d/c for a complicated bacteremia.   -Blood cx with NGTD  -PICC/midline ordered on 09/26

## 2023-09-25 NOTE — PROGRESS NOTES
[facility] Medicine  Progress Note    Patient Name: Jesse Fofana  MRN: [id number]  Patient Class: IP- Inpatient   Admission Date: 9/24/2023  Length of Stay: 1 days  Attending Physician: Bo Macario MD  Primary Care Provider: Denisha, Primary Doctor        Subjective:     Principal Problem:Cellulitis of right lower extremity        HPI:  Mr. Fofana is a 42 yo M with PMHx of PE, alcohol use who presents to the ED for evaluation of right ankle wound. He initially visited the ED on 7/26 for the wound and was prescribed bactrim, he only took 1 pill after that time. He then returned to ED on 8/16 where he was given a prescription for Ciprofloxacin/Doyxycline and then again on 9/2 where he was given 1 dose of Vanc/Zosyn then PO prescription for Bactrim.  Cultures were drawn on 09/02 and growing MSSA and Aceinetobacer lwoffi group .  Patient was called however no answer so he was never able to be notified.  He comes back today with ongoing drainage and swelling as well as pain. He was shocked to hear his blood cultures were positive the last stay and is in agreement with admission for IV antibiotics. He reports not taking his eliquis recently since admitted in May of this year for acute PE. He denies any fevers, chills or other wounds. He will be admitted to hospital medicine for further management.      Overview/Hospital Course:  Patient admitted with right ankle cellulitis.  Previously had MSSA and Acinetobacter bacteremia, that apparently was not treated to full extent.  Also had PE and ran out of Eliquis, unprovoked, Eliquis resumed.  Started on vancomycin and Zosyn.  Previous Acinetobacter resistant to Zosyn, will switch to vanc and ciprofloxacin.  MRI ordered to rule out osteomyelitis.    Interval History:  NAEON.  No new issues.   Denies complaints.  All questions answered and updates on care given.       ROS:  General: Negative for fevers or chills.  Cardiac: Negative for chest pain or  orthopnea   Pulmonary: Negative for dyspnea or wheezing.  GI: Negative for abdominal distention or pain  +wound     Vitals:    09/24/23 2056 09/25/23 0032 09/25/23 0504 09/25/23 0537   BP:  (!) 152/92 (!) 150/89    BP Location:  Left arm Left arm    Patient Position:  Lying Lying    Pulse:  74 65    Resp: 20 18 16 16   Temp:  98.8 °F (37.1 °C) 99.2 °F (37.3 °C)    TempSrc:  Oral Oral    SpO2:  96% 97%    Weight:       Height:              Body mass index is 22.07 kg/m².      PHYSICAL EXAM:  GENERAL APPEARANCE: alert and cooperative, and appears to be in no acute distress.  HEENT:     HEAD: NC/AT     EYES: PERRL, EOMI.  Vision is grossly intact.  NECK: Neck supple, non-tender without LAD, masses or thyromegaly.  CARDIAC: There is no peripheral edema, cyanosis or pallor.   LUNGS: Clear to auscultation and percussion without rales or wheezing  ABDOMEN: Non-distended. No guarding.  MSK: No joint erythema or tenderness.   EXTREMITIES: No significant deformity or joint abnormality. No edema.   NEUROLOGICAL: CN II-XII grossly intact.   SKIN: Wound R ankle, see photos  PSYCHIATRIC: Oriented. No tangential speech. No Hyperactive features.        Recent Results (from the past 24 hour(s))   CBC auto differential    Collection Time: 09/24/23 11:21 AM   Result Value Ref Range    WBC 8.98 3.90 - 12.70 K/uL    RBC 4.52 (L) 4.60 - 6.20 M/uL    Hemoglobin 14.0 14.0 - 18.0 g/dL    Hematocrit 41.9 40.0 - 54.0 %    MCV 93 82 - 98 fL    MCH 31.0 27.0 - 31.0 pg    MCHC 33.4 32.0 - 36.0 g/dL    RDW 13.8 11.5 - 14.5 %    Platelets 255 150 - 450 K/uL    MPV 9.0 (L) 9.2 - 12.9 fL    Immature Granulocytes 0.2 0.0 - 0.5 %    Gran # (ANC) 5.7 1.8 - 7.7 K/uL    Immature Grans (Abs) 0.02 0.00 - 0.04 K/uL    Lymph # 2.5 1.0 - 4.8 K/uL    Mono # 0.6 0.3 - 1.0 K/uL    Eos # 0.2 0.0 - 0.5 K/uL    Baso # 0.04 0.00 - 0.20 K/uL    nRBC 0 0 /100 WBC    Gran % 63.3 38.0 - 73.0 %    Lymph % 28.1 18.0 - 48.0 %    Mono % 6.2 4.0 - 15.0 %    Eosinophil % 1.8  0.0 - 8.0 %    Basophil % 0.4 0.0 - 1.9 %    Differential Method Automated    Comprehensive metabolic panel    Collection Time: 09/24/23 11:21 AM   Result Value Ref Range    Sodium 141 136 - 145 mmol/L    Potassium 4.1 3.5 - 5.1 mmol/L    Chloride 107 95 - 110 mmol/L    CO2 24 23 - 29 mmol/L    Glucose 91 70 - 110 mg/dL    BUN 8 6 - 20 mg/dL    Creatinine 0.7 0.5 - 1.4 mg/dL    Calcium 8.7 8.7 - 10.5 mg/dL    Total Protein 7.9 6.0 - 8.4 g/dL    Albumin 3.5 3.5 - 5.2 g/dL    Total Bilirubin 0.2 0.1 - 1.0 mg/dL    Alkaline Phosphatase 61 55 - 135 U/L    AST 23 10 - 40 U/L    ALT 13 10 - 44 U/L    eGFR >60 >60 mL/min/1.73 m^2    Anion Gap 10 8 - 16 mmol/L   Blood culture #1 **CANNOT BE ORDERED STAT**    Collection Time: 09/24/23 11:21 AM    Specimen: Peripheral, Upper Arm, Right; Blood   Result Value Ref Range    Blood Culture, Routine No Growth to date    Blood culture #2 **CANNOT BE ORDERED STAT**    Collection Time: 09/24/23 11:21 AM    Specimen: Peripheral, Antecubital, Right; Blood   Result Value Ref Range    Blood Culture, Routine No Growth to date    Procalcitonin    Collection Time: 09/24/23  6:28 PM   Result Value Ref Range    Procalcitonin 0.08 <0.25 ng/mL   Comprehensive Metabolic Panel (CMP)    Collection Time: 09/25/23  4:49 AM   Result Value Ref Range    Sodium 138 136 - 145 mmol/L    Potassium 4.1 3.5 - 5.1 mmol/L    Chloride 103 95 - 110 mmol/L    CO2 27 23 - 29 mmol/L    Glucose 111 (H) 70 - 110 mg/dL    BUN 9 6 - 20 mg/dL    Creatinine 0.8 0.5 - 1.4 mg/dL    Calcium 8.7 8.7 - 10.5 mg/dL    Total Protein 7.2 6.0 - 8.4 g/dL    Albumin 3.1 (L) 3.5 - 5.2 g/dL    Total Bilirubin 0.6 0.1 - 1.0 mg/dL    Alkaline Phosphatase 59 55 - 135 U/L    AST 16 10 - 40 U/L    ALT 9 (L) 10 - 44 U/L    eGFR >60 >60 mL/min/1.73 m^2    Anion Gap 8 8 - 16 mmol/L   Magnesium    Collection Time: 09/25/23  4:49 AM   Result Value Ref Range    Magnesium 2.0 1.6 - 2.6 mg/dL   Phosphorus    Collection Time: 09/25/23  4:49 AM    Result Value Ref Range    Phosphorus 2.4 (L) 2.7 - 4.5 mg/dL   CBC with Automated Differential    Collection Time: 09/25/23  4:49 AM   Result Value Ref Range    WBC 7.80 3.90 - 12.70 K/uL    RBC 4.63 4.60 - 6.20 M/uL    Hemoglobin 14.1 14.0 - 18.0 g/dL    Hematocrit 43.0 40.0 - 54.0 %    MCV 93 82 - 98 fL    MCH 30.5 27.0 - 31.0 pg    MCHC 32.8 32.0 - 36.0 g/dL    RDW 13.7 11.5 - 14.5 %    Platelets 258 150 - 450 K/uL    MPV 9.1 (L) 9.2 - 12.9 fL    Immature Granulocytes 0.4 0.0 - 0.5 %    Gran # (ANC) 6.0 1.8 - 7.7 K/uL    Immature Grans (Abs) 0.03 0.00 - 0.04 K/uL    Lymph # 1.3 1.0 - 4.8 K/uL    Mono # 0.4 0.3 - 1.0 K/uL    Eos # 0.1 0.0 - 0.5 K/uL    Baso # 0.02 0.00 - 0.20 K/uL    nRBC 0 0 /100 WBC    Gran % 76.5 (H) 38.0 - 73.0 %    Lymph % 16.9 (L) 18.0 - 48.0 %    Mono % 5.1 4.0 - 15.0 %    Eosinophil % 0.8 0.0 - 8.0 %    Basophil % 0.3 0.0 - 1.9 %    Differential Method Automated        Microbiology Results (last 7 days)       Procedure Component Value Units Date/Time    Blood culture #1 **CANNOT BE ORDERED STAT** [[id number]] Collected: 09/24/23 1121    Order Status: Completed Specimen: Blood from Peripheral, Upper Arm, Right Updated: 09/24/23 2112     Blood Culture, Routine No Growth to date    Blood culture #2 **CANNOT BE ORDERED STAT** [[id number]] Collected: 09/24/23 1121    Order Status: Completed Specimen: Blood from Peripheral, Antecubital, Right Updated: 09/24/23 2112     Blood Culture, Routine No Growth to date    Aerobic culture (Specify Source) **CANNOT BE ORDERED AS STAT** [[id number]]     Order Status: No result Specimen: Wound from Ankle, Right              Imaging Results              US Lower Extremity Veins Right (Final result)  Result time 09/24/23 13:26:33      Final result by Hector Underwood MD (09/24/23 13:26:33)                   Impression:      Nonocclusive DVT in the femoral and popliteal veins with collateral vessel formation, which may be chronic.    Solitary peroneal and also  posterior tibial veins are identified and appear patent, noting thrombosis of a nonvisualized potential paired vein not excluded on the basis of this examination.      Electronically signed by: Hector Underwood MD  Date:    09/24/2023  Time:    13:26               Narrative:    EXAMINATION:  US LOWER EXTREMITY VEINS RIGHT    CLINICAL HISTORY:  Other specified soft tissue disorders    TECHNIQUE:  Duplex and color flow Doppler evaluation and graded compression of the right lower extremity veins was performed.    COMPARISON:  None    FINDINGS:  Right thigh veins: Nonocclusive thrombus seen within the femoral vein and popliteal vein with multiple collateral vessels also noted, presumably chronic.  Common femoral and greater saphenous veins appear patent with normal directional flow and waveforms.    Right calf veins: Paired anterior tibial veins are patent.  Solitary peroneal and also posterior tibial veins are identified and appear patent, which may be normal variant.    Contralateral CFV: The contralateral (left) common femoral vein is patent and free of thrombus.    Miscellaneous: None                                       X-Ray Ankle Complete Right (Final result)  Result time 09/24/23 12:00:59      Final result by Stacy Tierney MD (09/24/23 12:00:59)                   Impression:      As above.      Electronically signed by: Stacy Tierney MD  Date:    09/24/2023  Time:    12:00               Narrative:    EXAMINATION:  XR ANKLE COMPLETE 3 VIEW RIGHT    CLINICAL HISTORY:  Cellulitis of right lower limb    TECHNIQUE:  AP, lateral, and oblique images of the right ankle were performed.    COMPARISON:  None    FINDINGS:  No acute fracture or bony destructive process is seen.  Alignment is preserved.  There is soft tissue swelling about the ankle.  Overlying bandage material obscures detail somewhat.  There is irregularity of the skin surface posterior to the distal tibia and fibula; it would be difficult to exclude  a skin defect or open wound at this site.  No definite air is seen in the soft tissues, but this could be obscured by the bandage material.                                                    Assessment/Plan:      * Cellulitis of right lower extremity  Presents with unhealing cellulitis to the right lower extremity.  Has been prescribed Bactrim, doxycycline and ciprofloxacin in the past with no improvement.  Unclear if he was actually taking these medications though.   - IV zosyn/vanc and de-escalate  - wound culture ordered  - xray with no joint involvement  - continue with supportive care      Bacteremia  During ED evaluation on 09/02 of this year, blood cultures were drawn and were positive for MSSA and Acinetobacter.  He is not had appropriate treatment.  Recheck blood cultures this admission.  Check echocardiogram  Consult Infectious Disease  Previously had MSSA and Acinetobacter bacteremia, that apparently was not treated to full extent.   Started on vancomycin and Zosyn.  Previous Acinetobacter r resistant to Zosyn, will switch to vanc and ciprofloxacin.    MRI ordered to rule out osteomyelitis.      History of pulmonary embolus (PE)  Diagnosed with acute PE back in May of this year.  He was given Eliquis however he states he ran out and never followed up.  We will start back on loading dose of Eliquis and continue.  Appears unprovoked.      Tobacco use  Nicotine patch offered inpatient refused at this time.        VTE Risk Mitigation (From admission, onward)           Ordered     apixaban tablet 10 mg  2 times daily         09/24/23 1605                    Discharge Planning   SIMA: 9/24/2023     Code Status: Full Code   Is the patient medically ready for discharge?:     Reason for patient still in hospital (select all that apply): Patient trending condition and Treatment  Discharge Plan A: Home                  Bo Macario MD  Department of Hospital Medicine   [facility]

## 2023-09-25 NOTE — PLAN OF CARE
Problem: Pain Acute  Goal: Acceptable Pain Control and Functional Ability  Outcome: Ongoing, Progressing  Intervention: Develop Pain Management Plan  Flowsheets (Taken 9/25/2023 0645)  Pain Management Interventions:   care clustered   diversional activity provided   medication offered   pain management plan reviewed with patient/caregiver   position adjusted   quiet environment facilitated  Intervention: Prevent or Manage Pain  Flowsheets (Taken 9/25/2023 0645)  Sleep/Rest Enhancement:   awakenings minimized   consistent schedule promoted   noise level reduced   regular sleep/rest pattern promoted   room darkened  Medication Review/Management: medications reviewed  Intervention: Optimize Psychosocial Wellbeing  Flowsheets (Taken 9/25/2023 0645)  Supportive Measures:   active listening utilized   decision-making supported   problem-solving facilitated   verbalization of feelings encouraged  Diversional Activities:   television   smartphone

## 2023-09-25 NOTE — HPI
43M with h/o PE (stopped eliquis)  admitted 9/24 with R ankle wound. Reports several ED visits for the same. Was given several different antibiotics including bactrim, cipro, dox at prior visits, but didn't complete. 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 Wears steel toe boots. Reports drainage from R lower leg wound recently. Reports leg warm. Says he has hardware in L leg, but denies R leg hardware. Denies f/c.       Bcx 9/2 with MSSA and acenitobacter in one of 2; repeat weren't drawn until 9/24 NGTD    2d echo ordered, pending    Blood culture #1 **CANNOT BE ORDERED STAT** [[id number]] Collected: 09/24/23 1121   Order Status: Completed Specimen: Blood from Peripheral, Upper Arm, Right Updated: 09/24/23 2112    Blood Culture, Routine No Growth to date   Blood culture #2 **CANNOT BE ORDERED STAT** [[id number]] Collected: 09/24/23 1121   Order Status: Completed Specimen: Blood from Peripheral, Antecubital, Right Updated: 09/24/23 2112    Blood Culture, Routine No Growth to date       Blood culture [[id number]] Collected: 09/02/23 1656   Order Status: Completed Specimen: Blood from Peripheral, Antecubital, Left Updated: 09/06/23 1903    Blood Culture, Routine No Growth after 4 days.   Blood culture [[id number]] (Abnormal)  Collected: 09/02/23 1656   Order Status: Completed Specimen: Blood from Peripheral, Antecubital, Left Updated: 09/06/23 0742    Blood Culture, Routine Gram stain aer bottle: Gram positive cocci in pairs     Results called to and read back by: Dr. Moreno 09/03/2023  09:38     Gram stain meryl bottle: Gram positive cocci     Positive results previously called     Gram stain aer bottle: Gram positive cocci in pairs Gram negative rods     Results reviewed and amended. Called to and read back by: Mariposa Bhakta     09/04/2023  11:34     ACINETOBACTER LWOFFII GROUP Abnormal      STAPHYLOCOCCUS AUREUS Abnormal    Susceptibility     Acinetobacter lwoffii group  "Staphylococcus aureus     CULTURE, BLOOD CULTURE, BLOOD     Amikacin <=16 mcg/mL Sensitive       Amp/Sulbactam <=8/4 mcg/mL Sensitive       Cefepime <=2 mcg/mL Sensitive       Ceftriaxone 8 mcg/mL Sensitive       Ciprofloxacin <=1 mcg/mL Sensitive       Clindamycin   <=0.5 mcg/mL Resistant     Erythromycin   >4 mcg/mL Resistant     Gentamicin <=4 mcg/mL Sensitive       Levofloxacin <=2 mcg/mL Sensitive       Meropenem <=1 mcg/mL Sensitive       Minocycline <=4 mcg/mL Sensitive       Oxacillin   0.5 mcg/mL Sensitive     Penicillin   >8 mcg/mL Resistant     Tetracycline <=4 mcg/mL Sensitive <=4 mcg/mL Sensitive     Tobramycin <=4 mcg/mL Sensitive       Trimeth/Sulfa >2/38 mcg/mL Resistant <=0.5/9.5 m... Sensitive     Vancomycin   2 mcg/mL Sensitive            Vascular consult pending    MRI R ankle ordered    Wound care following        Has received vanc/zosyn/cipro    ID consulted for "IV antibiotic recommendations, MSSA/acinetobacter bacteremia  "

## 2023-09-25 NOTE — CONSULTS
[facility] Surg  Wound Care    Patient Name:  Jesse Fofana   MRN:  [id number]  Date: 9/25/2023  Diagnosis: Cellulitis of right lower extremity    History:     History reviewed. No pertinent past medical history.    Social History     Socioeconomic History    Marital status: Single   Tobacco Use    Smoking status: Every Day     Current packs/day: 1.00     Average packs/day: 1 pack/day for 25.0 years (25.0 ttl pk-yrs)     Types: Cigarettes     Start date: 9/24/1998    Smokeless tobacco: Never   Substance and Sexual Activity    Alcohol use: Yes     Alcohol/week: 3.0 - 4.0 standard drinks of alcohol     Types: 3 - 4 Cans of beer per week     Comment: daily drinker    Drug use: Not Currently       Precautions:     Allergies as of 09/24/2023    (No Known Allergies)       [facility] Assessment Details/Treatment   Consulted for wound care right lower extremity cellulitis/wound  A 43 year old male admitted 9/24/23 from home with cellulitis of RLE; bacteremia; history PE; tobacco use  9/25 WBC 7.8 Hgb 14.1 Hct 43.0 Alb 3.1 Weight 176 lbs  On Isoflex mattress; Luther score 20; smoker  9/24 US RLE veins- Non occlusive DVT in the femoral and popliteal veins with collateral vessel formation, which may be chronic  Consult to Vascular Surgery  9/24 5:10 pm 4 Eyes Skin Assessment- No Pressure Injuries Present  Assessment:  Photodocumentation    Right lower leg (from Media- taken 9/24)    Right posterior lower extremity ulcer- Ulceration 10 cm(L) 9.5 cm(W) with moderate amount yellow drainage. Base of wound covered with yellow slough. Painful to touch.   History of swelling after being at work. Leg not swollen in morning. Strong palpable DP pulse. States wound has been present for 2 months. Did not take antibiotics because he drinks beer after work and did not want to mix beer and antibiotic. Denies history of DVT.   Treatment:  Cleansed wound with Vashe. Dressed with Aquacel Ag hydrofiber covered with Mepilex foam 6x6 dressing.  Applied single layer of Tubigrip D for light compression.   Instructed patient on smoking affecting wound healing. Also explained use of compression to promote wound healing and managing edema.   Recommendations made to primary team. Orders placed.     09/25/2023

## 2023-09-25 NOTE — ASSESSMENT & PLAN NOTE
"43M with h/o PE admitted 9/24 with several months of a progressive R lower leg wound- warmth, foul smelling drainage. Bcx 9/2 with MSSA and acenitobacter in one of 2; repeat weren't drawn until 9/24 NGTD. 2d echo ordered, pending. Vascular consult pending, wound care following. R ankle MRI ordered. Has received vanc/zosyn/cipro. ID consulted for "IV antibiotic recommendations, MSSA/acinetobacter bacteremia    Staph aureus can never be disregarded as a contaminant, and pt hasn't had adequate treatment. Suspect R lower leg wound source of bacteremia. Unclear extent of R leg infection- SSTI vs OM    Recommendation:  - agree with MRI imaging to r/o abscess and eval for osteo. Please make sure R lower leg is included in mri exam. If any abscess or osteo, consider biopsy for path/cultures (to guide antibiotic therapy)  - continue vancomycin. Change cipro --> cefepime  - agree with 2d echo to eval for vegetations  - wait for repeat bcx to be neg x 48h before placing picc  - anticipate 4-6 weeks of IV antibiotics on d/c        "

## 2023-09-25 NOTE — PLAN OF CARE
Plan of care is ongoing.    Problem: Adult Inpatient Plan of Care  Goal: Plan of Care Review  Outcome: Ongoing, Progressing  Goal: Patient-Specific Goal (Individualized)  Outcome: Ongoing, Progressing  Goal: Absence of Hospital-Acquired Illness or Injury  Outcome: Ongoing, Progressing  Goal: Optimal Comfort and Wellbeing  Outcome: Ongoing, Progressing  Goal: Readiness for Transition of Care  Outcome: Ongoing, Progressing     Problem: Fall Injury Risk  Goal: Absence of Fall and Fall-Related Injury  Outcome: Ongoing, Progressing     Problem: Pain Acute  Goal: Acceptable Pain Control and Functional Ability  Outcome: Ongoing, Progressing     Problem: Infection  Goal: Absence of Infection Signs and Symptoms  Outcome: Ongoing, Progressing

## 2023-09-25 NOTE — PROGRESS NOTES
Vancomycin consult follow-up:    Patient reviewed, renal function stable, no new levels, continue current therapy; Next levels due: trough due 9/26/2023 at 0030

## 2023-09-25 NOTE — NURSING
[facility] [place]  Nurses Note -- 4 Eyes      9/24/2023       Skin assessed on: Q Shift      [x] No Pressure Injuries Present    [x]Prevention Measures Documented    [] Yes LDA  for Pressure Injury Previously documented     [] Yes New Pressure Injury Discovered   [] LDA for New Pressure Injury Added      Attending RN:  Love Espana RN     Second RN: PATY Moses

## 2023-09-25 NOTE — ASSESSMENT & PLAN NOTE
During ED evaluation on 09/02 of this year, blood cultures were drawn and were positive for MSSA and Acinetobacter.  He is not had appropriate treatment.  Recheck blood cultures this admission.  Check echocardiogram  Consult Infectious Disease  Previously had MSSA and Acinetobacter bacteremia, that apparently was not treated to full extent.   Started on vancomycin and Zosyn.  Previous scenario Parmar resistant to Zosyn, will switch to vanc and ciprofloxacin.    MRI ordered to rule out osteomyelitis.

## 2023-09-25 NOTE — CONSULTS
[facility] Surg  Vascular Surgery  Consult Note    Inpatient consult to Vascular Surgery  Consult performed by: Josh Devlin MD  Consult ordered by: Phong Palomino MD  Reason for consult: chronic wound on ankle        Subjective:     Chief Complaint/Reason for Admission: R leg wound with MRSA bacteremia    History of Present Illness: 43 M with hx of R leg wound p/w with MRSA bacteremia.  Pt states wound started in July after what he thought was an insect bite he noticed whenn wearing boots.  Wound gradually worsened, never healed, on/off abx, worsening sweling.    His other leg is not swollen and has no known hx of diabetes or lymphedema.      Medications Prior to Admission   Medication Sig Dispense Refill Last Dose    famotidine (PEPCID) 20 MG tablet Take 1 tablet (20 mg total) by mouth 2 (two) times daily. 60 tablet 0     meloxicam (MOBIC) 15 MG tablet Take 1 tablet (15 mg total) by mouth once daily. 30 tablet 0 9/23/2023    traMADoL (ULTRAM) 50 mg tablet Take 1 tablet (50 mg total) by mouth every 4 (four) hours as needed for Pain. 12 tablet 0 9/23/2023    ibuprofen (ADVIL,MOTRIN) 600 MG tablet Take 1 tablet (600 mg total) by mouth every 6 (six) hours as needed. 20 tablet 0        Review of patient's allergies indicates:  No Known Allergies    History reviewed. No pertinent past medical history.  History reviewed. No pertinent surgical history.  Family History    None       Tobacco Use    Smoking status: Every Day     Current packs/day: 1.00     Average packs/day: 1 pack/day for 25.0 years (25.0 ttl pk-yrs)     Types: Cigarettes     Start date: 9/24/1998    Smokeless tobacco: Never   Substance and Sexual Activity    Alcohol use: Yes     Alcohol/week: 3.0 - 4.0 standard drinks of alcohol     Types: 3 - 4 Cans of beer per week     Comment: daily drinker    Drug use: Not Currently    Sexual activity: Not on file     Review of Systems   All other systems reviewed and are negative.    Objective:  "    Vital Signs (Most Recent):  Temp: 98 °F (36.7 °C) (09/25/23 1610)  Pulse: 65 (09/25/23 1610)  Resp: 18 (09/25/23 1626)  BP: (!) 168/95 (09/25/23 1610)  SpO2: 98 % (09/25/23 1610) Vital Signs (24h Range):  Temp:  [97.8 °F (36.6 °C)-99.3 °F (37.4 °C)] 98 °F (36.7 °C)  Pulse:  [59-81] 65  Resp:  [15-20] 18  SpO2:  [96 %-99 %] 98 %  BP: (137-168)/(78-95) 168/95     Weight: 80.1 kg (176 lb 9.4 oz)  Body mass index is 22.07 kg/m².    Date 09/25/23 0700 - 09/26/23 0659   Shift 4924-8781 0784-2039 5560-6664 24 Hour Total   INTAKE   P.O. 480   480   Shift Total(mL/kg) 480(6)   480(6)   OUTPUT   Shift Total(mL/kg)       Weight (kg) 80.1 80.1 80.1 80.1       Physical Exam    2+ pedal pulses bilaterally     Media Information    File Link    Scan on 9/25/2023  4:16 PM by Lennie Thorpe CNS: Right posterior lower leg        Key Information    Document ID File Type Document Type Description   [id number] Image Photographs Right posterior lower leg     Import Information    Attached At Date Time User Dept   Encounter Level 9/25/2023  4:16 PM Lennie Thorpe CNS [facility] Surgical [facility]     Encounter    Hospital Encounter on 9/24/23     Significant Labs:  CBC:   Recent Labs   Lab 09/25/23 0449   WBC 7.80   RBC 4.63   HGB 14.1   HCT 43.0      MCV 93   MCH 30.5   MCHC 32.8     CMP:   Recent Labs   Lab 09/25/23  0449   *   CALCIUM 8.7   ALBUMIN 3.1*   PROT 7.2      K 4.1   CO2 27      BUN 9   CREATININE 0.8   ALKPHOS 59   ALT 9*   AST 16   BILITOT 0.6     Coagulation: No results for input(s): "LABPROT", "INR", "APTT" in the last 48 hours.  eGFR: No results for input(s): "EGFRIFAFRICA", "EGFRCYSTC", "LABGLOM" in the last 48 hours.    Invalid input(s): "EGFRNON-AA"  Hemoglobin: No results for input(s): "HGBA1C" in the last 48 hours.    Significant Diagnostics:  I have reviewed all pertinent imaging results/findings within the past 24 hours.  Narrative & Impression  EXAMINATION:  US LOWER EXTREMITY " VEINS RIGHT     CLINICAL HISTORY:  Other specified soft tissue disorders     TECHNIQUE:  Duplex and color flow Doppler evaluation and graded compression of the right lower extremity veins was performed.     COMPARISON:  None     FINDINGS:  Right thigh veins: Nonocclusive thrombus seen within the femoral vein and popliteal vein with multiple collateral vessels also noted, presumably chronic.  Common femoral and greater saphenous veins appear patent with normal directional flow and waveforms.     Right calf veins: Paired anterior tibial veins are patent.  Solitary peroneal and also posterior tibial veins are identified and appear patent, which may be normal variant.     Contralateral CFV: The contralateral (left) common femoral vein is patent and free of thrombus.     Miscellaneous: None     Impression:     Nonocclusive DVT in the femoral and popliteal veins with collateral vessel formation, which may be chronic.     Solitary peroneal and also posterior tibial veins are identified and appear patent, noting thrombosis of a nonvisualized potential paired vein not excluded on the basis of this examination.        Electronically signed by: Hector Underwood MD  Date:                                            09/24/2023  Time:                                           13:26      Echo :    Left Ventricle: The left ventricle is normal in size. There is mild concentric hypertrophy. There is normal systolic function with a visually estimated ejection fraction of 65 - 70%.    Right Ventricle: Normal right ventricular cavity size. Systolic function is normal.    Pulmonary Artery: The estimated pulmonary artery systolic pressure is 23 mmHg.    IVC/SVC: Normal venous pressure at 3 mmHg.  Assessment/Plan:     Active Diagnoses:    Diagnosis Date Noted POA    PRINCIPAL PROBLEM:  Cellulitis of right lower extremity [L03.115] 09/24/2023 Yes    History of pulmonary embolus (PE) [Z86.711] 09/24/2023 Yes    Bacteremia [R78.81] 09/24/2023 Yes     Tobacco use [Z72.0] 10/24/2016 Yes      Problems Resolved During this Admission:   43 M with chronic draining R leg wound of unknown etiology p/w with MRSA bacteremia.  Wound unlikely to be of vascular etiology.  Venous ultrasound showing chronic non-occlusive DVT is likely secondary to chronic inflammatory wound.  Venous insufficiency, if present, may contribute to slow wound healing although is not likely source of wound.  No concern for arterial etiology     Agree with MRI  Obtain venous insufficiency studies   Recommend Gen Surg consult for potential debridement and biopsy  Agree w abx per ID  Agree with AC and compression stocking    Thank you for your consult. I will follow-up with patient. Please contact us if you have any additional questions.    Josh Devlin MD  Vascular Surgery  [facility] - [place], [place] - [facility] Surg

## 2023-09-25 NOTE — CONSULTS
"[facility] - [facility] Surg  Infectious Disease  Consult Note    Patient Name: Jesse Fofana  MRN: [id number]  Admission Date: 9/24/2023  Hospital Length of Stay: 1 days  Attending Physician: Bo Macario MD  Primary Care Provider: No, Primary Doctor     Isolation Status: No active isolations    Patient information was obtained from patient and ER records.      Inpatient consult to Infectious Diseases  Consult performed by: Nery Robin MD  Consult ordered by: Phong Palomino MD        Assessment/Plan:     ID  Bacteremia  43M with h/o PE admitted 9/24 with several months of a progressive R lower leg wound- warmth, foul smelling drainage. Bcx 9/2 with MSSA and acenitobacter in one of 2; repeat weren't drawn until 9/24 NGTD. 2d echo ordered, pending. Vascular consult pending, wound care following. R ankle MRI ordered. Has received vanc/zosyn/cipro. ID consulted for "IV antibiotic recommendations, MSSA/acinetobacter bacteremia    Staph aureus can never be disregarded as a contaminant, and pt hasn't had adequate treatment. Suspect R lower leg wound source of bacteremia. Unclear extent of R leg infection- SSTI vs OM    Recommendation:  - agree with MRI imaging to r/o abscess and eval for osteo. Please make sure R lower leg is included in mri exam. If any abscess or osteo, consider biopsy for path/cultures (to guide antibiotic therapy)  - continue vancomycin. Change cipro --> cefepime  - agree with 2d echo to eval for vegetations  - wait for repeat bcx to be neg x 48h before placing picc  - anticipate 4-6 weeks of IV antibiotics on d/c              Thank you for your consult. I will follow-up with patient. Please contact us if you have any additional questions.    Nery Robin MD  Infectious Disease  [facility] - Med Surg    Subjective:     Principal Problem: Cellulitis of right lower extremity    HPI:   43M with h/o PE (stopped eliquis)  admitted 9/24 with R ankle wound. Reports several ED visits for the same. Was " given several different antibiotics including bactrim, cipro, dox at prior visits, but didn't complete. 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 Wears steel toe boots. Reports drainage from R lower leg wound recently. Reports leg warm. Says he has hardware in L leg, but denies R leg hardware. Denies f/c.       Bcx 9/2 with MSSA and acenitobacter in one of 2; repeat weren't drawn until 9/24 NGTD    2d echo ordered, pending    Blood culture #1 **CANNOT BE ORDERED STAT** [[id number]] Collected: 09/24/23 1121   Order Status: Completed Specimen: Blood from Peripheral, Upper Arm, Right Updated: 09/24/23 2112    Blood Culture, Routine No Growth to date   Blood culture #2 **CANNOT BE ORDERED STAT** [[id number]] Collected: 09/24/23 1121   Order Status: Completed Specimen: Blood from Peripheral, Antecubital, Right Updated: 09/24/23 2112    Blood Culture, Routine No Growth to date       Blood culture [[id number]] Collected: 09/02/23 1656   Order Status: Completed Specimen: Blood from Peripheral, Antecubital, Left Updated: 09/06/23 1903    Blood Culture, Routine No Growth after 4 days.   Blood culture [[id number]] (Abnormal)  Collected: 09/02/23 1656   Order Status: Completed Specimen: Blood from Peripheral, Antecubital, Left Updated: 09/06/23 0742    Blood Culture, Routine Gram stain aer bottle: Gram positive cocci in pairs     Results called to and read back by: Dr. Moreno 09/03/2023  09:38     Gram stain meryl bottle: Gram positive cocci     Positive results previously called     Gram stain aer bottle: Gram positive cocci in pairs Gram negative rods     Results reviewed and amended. Called to and read back by: Mariposa Bhakta     09/04/2023  11:34     ACINETOBACTER LWOFFII GROUP Abnormal      STAPHYLOCOCCUS AUREUS Abnormal    Susceptibility     Acinetobacter lwoffii group Staphylococcus aureus     CULTURE, BLOOD CULTURE, BLOOD     Amikacin <=16 mcg/mL Sensitive       Amp/Sulbactam <=8/4  "mcg/mL Sensitive       Cefepime <=2 mcg/mL Sensitive       Ceftriaxone 8 mcg/mL Sensitive       Ciprofloxacin <=1 mcg/mL Sensitive       Clindamycin   <=0.5 mcg/mL Resistant     Erythromycin   >4 mcg/mL Resistant     Gentamicin <=4 mcg/mL Sensitive       Levofloxacin <=2 mcg/mL Sensitive       Meropenem <=1 mcg/mL Sensitive       Minocycline <=4 mcg/mL Sensitive       Oxacillin   0.5 mcg/mL Sensitive     Penicillin   >8 mcg/mL Resistant     Tetracycline <=4 mcg/mL Sensitive <=4 mcg/mL Sensitive     Tobramycin <=4 mcg/mL Sensitive       Trimeth/Sulfa >2/38 mcg/mL Resistant <=0.5/9.5 m... Sensitive     Vancomycin   2 mcg/mL Sensitive            Vascular consult pending    MRI R ankle ordered    Wound care following        Has received vanc/zosyn/cipro    ID consulted for "IV antibiotic recommendations, MSSA/acinetobacter bacteremia      History reviewed. No pertinent past medical history.    History reviewed. No pertinent surgical history.    Review of patient's allergies indicates:  No Known Allergies    No current facility-administered medications on file prior to encounter.     Current Outpatient Medications on File Prior to Encounter   Medication Sig    famotidine (PEPCID) 20 MG tablet Take 1 tablet (20 mg total) by mouth 2 (two) times daily.    meloxicam (MOBIC) 15 MG tablet Take 1 tablet (15 mg total) by mouth once daily.    traMADoL (ULTRAM) 50 mg tablet Take 1 tablet (50 mg total) by mouth every 4 (four) hours as needed for Pain.    ibuprofen (ADVIL,MOTRIN) 600 MG tablet Take 1 tablet (600 mg total) by mouth every 6 (six) hours as needed.     Family History    None       Tobacco Use    Smoking status: Every Day     Current packs/day: 1.00     Average packs/day: 1 pack/day for 25.0 years (25.0 ttl pk-yrs)     Types: Cigarettes     Start date: 9/24/1998    Smokeless tobacco: Never   Substance and Sexual Activity    Alcohol use: Yes     Alcohol/week: 3.0 - 4.0 standard drinks of alcohol     Types: 3 - 4 " Cans of beer per week     Comment: daily drinker    Drug use: Not Currently    Sexual activity: Not on file     Review of Systems   Constitutional:  Negative for chills and fever.   Respiratory:  Negative for cough and shortness of breath.      Objective:     Vital Signs (Most Recent):  Temp: 97.8 °F (36.6 °C) (09/25/23 1116)  Pulse: (!) 59 (09/25/23 1116)  Resp: 18 (09/25/23 1240)  BP: (!) 147/90 (09/25/23 1116)  SpO2: 99 % (09/25/23 1116) Vital Signs (24h Range):  Temp:  [97.8 °F (36.6 °C)-99.3 °F (37.4 °C)] 97.8 °F (36.6 °C)  Pulse:  [59-81] 59  Resp:  [15-20] 18  SpO2:  [96 %-100 %] 99 %  BP: (137-161)/() 147/90     Weight: 80.1 kg (176 lb 9.4 oz)  Body mass index is 22.07 kg/m².     Physical Exam  Vitals and nursing note reviewed.   Constitutional:       Appearance: He is not ill-appearing.   HENT:      Head: Normocephalic and atraumatic.   Cardiovascular:      Rate and Rhythm: Normal rate and regular rhythm.      Pulses: Normal pulses.      Heart sounds: No murmur heard.  Pulmonary:      Effort: Pulmonary effort is normal. No respiratory distress.   Abdominal:      General: Bowel sounds are normal. There is no distension.      Tenderness: There is no abdominal tenderness.   Musculoskeletal:      Comments: Right lower extremity wrapped with gauze/bandaged  -    Skin:     Comments: R leg warm. Copious drainage from R lower leg, foul smelling and very tender. Palpable fluid collection   Neurological:      General: No focal deficit present.      Mental Status: He is alert and oriented to person, place, and time.   Psychiatric:         Mood and Affect: Mood normal.         Thought Content: Thought content normal.                  Significant Labs: All pertinent labs within the past 24 hours have been reviewed.    Significant Imaging: I have reviewed all pertinent imaging results/findings within the past 24 hours.

## 2023-09-25 NOTE — HOSPITAL COURSE
43M with h/o PE (Bilateral segmental PE on CT chest 05/09/2023) admitted on 9/24 with several months of a progressive R lower leg wound- warmth, foul smelling drainage. Blood cx from 9/2 with MSSA and acenitobacter in one of 2; repeat blood cx weren't drawn until 9/24, which shows NGTD. Echo without vegetations. MRI right ankle with No evidence for acute osteomyelitis at this time. ID consulted- recommends 4 weeks of IV cefepime on d/c for a complicated bacteremia. LENARD 10/21/2023. Suspect R lower leg wound source of bacteremia. Vacular surgery consulted and believes wound unlikely to be of vascular etiology and recommend General surgery consult, who does not recommend surgical debridement at this time. US RLE with no evidence of hemodynamically significant venous reflux in the right greater or lesser saphenous vein. Nonocclusive DVT in the femoral and popliteal veins with collateral vessel formation, which may be chronic. Wound care consulted.     Patient admits feeling better overall.  Pain is well controlled at this time.  PICC line placed on 09/26/2023. Pt denies any fever, headaches, vision changes, chest pain, shortness of breath, palpitations, abdominal pain, nausea, vomiting, or any new weaknesses. Feels ready to go home. Patient's exam on discharge was as follow: Patient is alert and oriented, appears in no acute distress, heart with regular rate and rhythm, lungs clear to asculation with non-labored breathing, abdomen soft, and no new weaknesses or focal deficits seen. Bilateral lower extremities without any edema or calf tenderness. Right posterior lower extremity ulcer with moderate amount yellow drainage. Base of wound covered with yellow slough. Painful to touch    Patient was counseled regarding any abnormal labs, differential diagnosis, treatment options, risk-benefit, lifestyle changes, current condition, and medications. Patient was interactive and attentive.  Patient's questions were answered in a  respectful and timely manner. Patient was instructed to follow-up with PCP within 1 week and to continue taking medications as prescribed.  Instructed to also follow up with infectious disease and wound care. Also, extensively discussed the risks, benefits, and side effects of patient's medications. Discussed with patient about any medication changes. Patient verbalized understanding and agrees to treatment plan.  Patient is stable for discharge.  Patient has no other questions or concerns at this time.  ED precautions discussed with the patient.    Vital signs are stable. Ambulating without any difficulty. Tolerating p.o. intake without any nausea or vomiting. Afebrile for over 24 hours. Patient is in stable condition and has no questions or concerns. Patient will be discharge to home with home health (wound care and home IV infusion abx) once transportation secured . Prescriptions sent to pharmacy.  SAWYER/JEANETH to assist with discharge planning.     Vitals:    09/27/23 0902 09/27/23 0937 09/27/23 1054   BP:   (!) 140/92   BP Location:      Patient Position:   Lying   Pulse:   73   Resp: 18 18 20   Temp:   97.6 °F (36.4 °C)   TempSrc:   Oral   SpO2:   100%   Weight:      Height:

## 2023-09-26 PROBLEM — I82.401 ACUTE DEEP VEIN THROMBOSIS (DVT) OF RIGHT LOWER EXTREMITY: Status: ACTIVE | Noted: 2023-09-26

## 2023-09-26 PROBLEM — E83.39 HYPOPHOSPHATEMIA: Status: ACTIVE | Noted: 2023-09-26

## 2023-09-26 LAB
ALBUMIN SERPL BCP-MCNC: 2.9 G/DL (ref 3.5–5.2)
ALP SERPL-CCNC: 55 U/L (ref 55–135)
ALT SERPL W/O P-5'-P-CCNC: 8 U/L (ref 10–44)
ANION GAP SERPL CALC-SCNC: 6 MMOL/L (ref 8–16)
AST SERPL-CCNC: 16 U/L (ref 10–40)
BASOPHILS # BLD AUTO: 0.04 K/UL (ref 0–0.2)
BASOPHILS NFR BLD: 0.6 % (ref 0–1.9)
BILIRUB SERPL-MCNC: 0.4 MG/DL (ref 0.1–1)
BUN SERPL-MCNC: 7 MG/DL (ref 6–20)
CALCIUM SERPL-MCNC: 9 MG/DL (ref 8.7–10.5)
CHLORIDE SERPL-SCNC: 103 MMOL/L (ref 95–110)
CO2 SERPL-SCNC: 28 MMOL/L (ref 23–29)
CREAT SERPL-MCNC: 0.8 MG/DL (ref 0.5–1.4)
DIFFERENTIAL METHOD: ABNORMAL
EOSINOPHIL # BLD AUTO: 0.2 K/UL (ref 0–0.5)
EOSINOPHIL NFR BLD: 2.9 % (ref 0–8)
ERYTHROCYTE [DISTWIDTH] IN BLOOD BY AUTOMATED COUNT: 13.6 % (ref 11.5–14.5)
EST. GFR  (NO RACE VARIABLE): >60 ML/MIN/1.73 M^2
GLUCOSE SERPL-MCNC: 144 MG/DL (ref 70–110)
HCT VFR BLD AUTO: 44.4 % (ref 40–54)
HGB BLD-MCNC: 14.1 G/DL (ref 14–18)
IMM GRANULOCYTES # BLD AUTO: 0.01 K/UL (ref 0–0.04)
IMM GRANULOCYTES NFR BLD AUTO: 0.1 % (ref 0–0.5)
LYMPHOCYTES # BLD AUTO: 1.8 K/UL (ref 1–4.8)
LYMPHOCYTES NFR BLD: 24.6 % (ref 18–48)
MAGNESIUM SERPL-MCNC: 2 MG/DL (ref 1.6–2.6)
MCH RBC QN AUTO: 31.2 PG (ref 27–31)
MCHC RBC AUTO-ENTMCNC: 31.8 G/DL (ref 32–36)
MCV RBC AUTO: 98 FL (ref 82–98)
MONOCYTES # BLD AUTO: 0.5 K/UL (ref 0.3–1)
MONOCYTES NFR BLD: 6.6 % (ref 4–15)
NEUTROPHILS # BLD AUTO: 4.7 K/UL (ref 1.8–7.7)
NEUTROPHILS NFR BLD: 65.2 % (ref 38–73)
NRBC BLD-RTO: 0 /100 WBC
PHOSPHATE SERPL-MCNC: 3.4 MG/DL (ref 2.7–4.5)
PLATELET # BLD AUTO: 254 K/UL (ref 150–450)
PMV BLD AUTO: 9.4 FL (ref 9.2–12.9)
POTASSIUM SERPL-SCNC: 4.3 MMOL/L (ref 3.5–5.1)
PROT SERPL-MCNC: 7.1 G/DL (ref 6–8.4)
RBC # BLD AUTO: 4.52 M/UL (ref 4.6–6.2)
SODIUM SERPL-SCNC: 137 MMOL/L (ref 136–145)
VANCOMYCIN TROUGH SERPL-MCNC: 10.2 UG/ML (ref 10–22)
WBC # BLD AUTO: 7.24 K/UL (ref 3.9–12.7)

## 2023-09-26 PROCEDURE — 36569 INSJ PICC 5 YR+ W/O IMAGING: CPT

## 2023-09-26 PROCEDURE — 85025 COMPLETE CBC W/AUTO DIFF WBC: CPT | Performed by: STUDENT IN AN ORGANIZED HEALTH CARE EDUCATION/TRAINING PROGRAM

## 2023-09-26 PROCEDURE — 25000003 PHARM REV CODE 250: Performed by: STUDENT IN AN ORGANIZED HEALTH CARE EDUCATION/TRAINING PROGRAM

## 2023-09-26 PROCEDURE — 63600175 PHARM REV CODE 636 W HCPCS: Performed by: INTERNAL MEDICINE

## 2023-09-26 PROCEDURE — 99232 SBSQ HOSP IP/OBS MODERATE 35: CPT | Mod: ,,, | Performed by: INTERNAL MEDICINE

## 2023-09-26 PROCEDURE — 99232 PR SUBSEQUENT HOSPITAL CARE,LEVL II: ICD-10-PCS | Mod: ,,, | Performed by: INTERNAL MEDICINE

## 2023-09-26 PROCEDURE — 80202 ASSAY OF VANCOMYCIN: CPT | Performed by: STUDENT IN AN ORGANIZED HEALTH CARE EDUCATION/TRAINING PROGRAM

## 2023-09-26 PROCEDURE — 80053 COMPREHEN METABOLIC PANEL: CPT | Performed by: STUDENT IN AN ORGANIZED HEALTH CARE EDUCATION/TRAINING PROGRAM

## 2023-09-26 PROCEDURE — 84100 ASSAY OF PHOSPHORUS: CPT | Performed by: STUDENT IN AN ORGANIZED HEALTH CARE EDUCATION/TRAINING PROGRAM

## 2023-09-26 PROCEDURE — 63600175 PHARM REV CODE 636 W HCPCS: Performed by: STUDENT IN AN ORGANIZED HEALTH CARE EDUCATION/TRAINING PROGRAM

## 2023-09-26 PROCEDURE — 36415 COLL VENOUS BLD VENIPUNCTURE: CPT | Performed by: STUDENT IN AN ORGANIZED HEALTH CARE EDUCATION/TRAINING PROGRAM

## 2023-09-26 PROCEDURE — 11000001 HC ACUTE MED/SURG PRIVATE ROOM

## 2023-09-26 PROCEDURE — 83735 ASSAY OF MAGNESIUM: CPT | Performed by: STUDENT IN AN ORGANIZED HEALTH CARE EDUCATION/TRAINING PROGRAM

## 2023-09-26 PROCEDURE — 25000003 PHARM REV CODE 250: Performed by: INTERNAL MEDICINE

## 2023-09-26 PROCEDURE — C1751 CATH, INF, PER/CENT/MIDLINE: HCPCS

## 2023-09-26 RX ORDER — SODIUM CHLORIDE 0.9 % (FLUSH) 0.9 %
10 SYRINGE (ML) INJECTION EVERY 6 HOURS
Status: DISCONTINUED | OUTPATIENT
Start: 2023-09-27 | End: 2023-09-27 | Stop reason: HOSPADM

## 2023-09-26 RX ORDER — HYDROCODONE BITARTRATE AND ACETAMINOPHEN 10; 325 MG/1; MG/1
1 TABLET ORAL EVERY 6 HOURS PRN
Status: DISCONTINUED | OUTPATIENT
Start: 2023-09-26 | End: 2023-09-27 | Stop reason: HOSPADM

## 2023-09-26 RX ORDER — SODIUM CHLORIDE 0.9 % (FLUSH) 0.9 %
10 SYRINGE (ML) INJECTION
Status: DISCONTINUED | OUTPATIENT
Start: 2023-09-26 | End: 2023-09-27 | Stop reason: HOSPADM

## 2023-09-26 RX ORDER — OXYCODONE HYDROCHLORIDE 5 MG/1
5 TABLET ORAL EVERY 6 HOURS PRN
Status: DISCONTINUED | OUTPATIENT
Start: 2023-09-26 | End: 2023-09-27 | Stop reason: HOSPADM

## 2023-09-26 RX ADMIN — APIXABAN 10 MG: 5 TABLET, FILM COATED ORAL at 08:09

## 2023-09-26 RX ADMIN — CEFEPIME 2 G: 2 INJECTION, POWDER, FOR SOLUTION INTRAVENOUS at 02:09

## 2023-09-26 RX ADMIN — MORPHINE SULFATE 4 MG: 4 INJECTION, SOLUTION INTRAMUSCULAR; INTRAVENOUS at 12:09

## 2023-09-26 RX ADMIN — CEFEPIME 2 G: 2 INJECTION, POWDER, FOR SOLUTION INTRAVENOUS at 06:09

## 2023-09-26 RX ADMIN — CEFEPIME 2 G: 2 INJECTION, POWDER, FOR SOLUTION INTRAVENOUS at 10:09

## 2023-09-26 RX ADMIN — VANCOMYCIN HYDROCHLORIDE 1500 MG: 1.5 INJECTION, POWDER, LYOPHILIZED, FOR SOLUTION INTRAVENOUS at 01:09

## 2023-09-26 RX ADMIN — HYDROCODONE BITARTRATE AND ACETAMINOPHEN 1 TABLET: 5; 325 TABLET ORAL at 11:09

## 2023-09-26 RX ADMIN — HYDROCODONE BITARTRATE AND ACETAMINOPHEN 1 TABLET: 10; 325 TABLET ORAL at 06:09

## 2023-09-26 NOTE — ASSESSMENT & PLAN NOTE
Patient has Abnormal Phosphorus: hypophosphatemia. Will continue to monitor electrolytes closely. Will replace the affected electrolytes and repeat labs to be done after interventions completed. The patient's phosphorus results have been reviewed and are listed below.  Recent Labs   Lab 09/26/23  0450   PHOS 3.4        -replace as needed

## 2023-09-26 NOTE — NURSING
[facility]  Nurses Note -- 4 Eyes      9/26/2023       Skin assessed on: Q Shift      [x] No Pressure Injuries Present    []Prevention Measures Documented    [] Yes LDA  for Pressure Injury Previously documented     [] Yes New Pressure Injury Discovered   [] LDA for New Pressure Injury Added      Attending RN:  Marcia Dominguez, PATY     Second RN:  Jalyn Rodriges RN

## 2023-09-26 NOTE — NURSING
Patient remained free of falls during shift. Care plan discussed with patient. Fall precautions in place. Patient verbalizes understanding of precautions. Pain medication given as ordered.   VSS, RR even and unlabored on room air. No acute distress noted. Will report to night nurse.

## 2023-09-26 NOTE — HPI
Jesse Fofana is a 43 year old male with a PMH of PE, MRSA bacteremia who presents due to right leg wound. He initially visited the ED on 7/26 for the wound and was prescribed bactrim, however he did not finish the course. He returned several times since then with various antibiotics given. He comes back today with ongoing drainage and swelling as well as pain. He reports that he has previously been covering the wound with neosporin and then placing gauze. He also notes that his work boot continually rubs this area. General surgery consulted to evaluate for debridement.

## 2023-09-26 NOTE — ASSESSMENT & PLAN NOTE
-Diagnosed with acute PE back in May [address]. He was given Eliquis however he states he ran out and never followed up.    -We will start back on loading dose of Eliquis and continue.  Appears unprovoked.

## 2023-09-26 NOTE — CONSULTS
[facility]  General Surgery  Consult Note    Patient Name: Jesse Fofana  MRN: [id number]  Code Status: Full Code  Admission Date: 9/24/2023  Hospital Length of Stay: 2 days  Attending Physician: Xochilt Jay DO  Primary Care Provider: Denisha, Primary Doctor    Patient information was obtained from patient, past medical records and ER records.     Inpatient consult to General Surgery  Consult performed by: Fartun Rodriguez MD  Consult ordered by: Xochilt Jay DO        Subjective:     Principal Problem: Cellulitis of right lower extremity    History of Present Illness: Jesse Fofnaa is a 43 year old male with a PMH of PE, MRSA bacteremia who presents due to right leg wound. He initially visited the ED on 7/26 for the wound and was prescribed bactrim, however he did not finish the course. He returned several times since then with various antibiotics given. He comes back today with ongoing drainage and swelling as well as pain. He reports that he has previously been covering the wound with neosporin and then placing gauze. He also notes that his work boot continually rubs this area. General surgery consulted to evaluate for debridement.          No current facility-administered medications on file prior to encounter.     Current Outpatient Medications on File Prior to Encounter   Medication Sig    famotidine (PEPCID) 20 MG tablet Take 1 tablet (20 mg total) by mouth 2 (two) times daily.    meloxicam (MOBIC) 15 MG tablet Take 1 tablet (15 mg total) by mouth once daily.    traMADoL (ULTRAM) 50 mg tablet Take 1 tablet (50 mg total) by mouth every 4 (four) hours as needed for Pain.    ibuprofen (ADVIL,MOTRIN) 600 MG tablet Take 1 tablet (600 mg total) by mouth every 6 (six) hours as needed.       Review of patient's allergies indicates:  No Known Allergies    History reviewed. No pertinent past medical history.  History reviewed. No pertinent surgical history.  Family History    None       Tobacco  Use    Smoking status: Every Day     Current packs/day: 1.00     Average packs/day: 1 pack/day for 25.0 years (25.0 ttl pk-yrs)     Types: Cigarettes     Start date: 9/24/1998    Smokeless tobacco: Never   Substance and Sexual Activity    Alcohol use: Yes     Alcohol/week: 3.0 - 4.0 standard drinks of alcohol     Types: 3 - 4 Cans of beer per week     Comment: daily drinker    Drug use: Not Currently    Sexual activity: Not on file     Review of Systems   Constitutional:  Negative for chills and fever.   Respiratory:  Negative for cough and shortness of breath.    Cardiovascular:  Negative for chest pain and palpitations.   Skin:  Positive for wound.     Objective:     Vital Signs (Most Recent):  Temp: 97 °F (36.1 °C) (09/26/23 0522)  Pulse: (!) 59 (09/26/23 0522)  Resp: 17 (09/26/23 0522)  BP: (!) 151/91 (09/26/23 0522)  SpO2: 97 % (09/26/23 0522) Vital Signs (24h Range):  Temp:  [97 °F (36.1 °C)-98 °F (36.7 °C)] 97 °F (36.1 °C)  Pulse:  [59-65] 59  Resp:  [15-18] 17  SpO2:  [96 %-99 %] 97 %  BP: (147-168)/(90-98) 151/91     Weight: 80.1 kg (176 lb 9.4 oz)  Body mass index is 22.07 kg/m².     Physical Exam  Vitals reviewed.   Constitutional:       General: He is not in acute distress.  HENT:      Head: Normocephalic and atraumatic.   Cardiovascular:      Rate and Rhythm: Normal rate.   Pulmonary:      Effort: Pulmonary effort is normal. No respiratory distress.   Abdominal:      General: There is no distension.      Palpations: Abdomen is soft.      Tenderness: There is no abdominal tenderness.   Skin:     General: Skin is warm and dry.   Neurological:      General: No focal deficit present.      Mental Status: He is alert and oriented to person, place, and time.              I have reviewed all pertinent lab results within the past 24 hours.  CBC:   Recent Labs   Lab 09/26/23  0450   WBC 7.24   RBC 4.52*   HGB 14.1   HCT 44.4      MCV 98   MCH 31.2*   MCHC 31.8*     CMP:   Recent Labs   Lab  09/26/23  0450   *   CALCIUM 9.0   ALBUMIN 2.9*   PROT 7.1      K 4.3   CO2 28      BUN 7   CREATININE 0.8   ALKPHOS 55   ALT 8*   AST 16   BILITOT 0.4       Significant Diagnostics:  I have reviewed all pertinent imaging results/findings within the past 24 hours.      Assessment/Plan:     * Cellulitis of right lower extremity  43 year old male with PMH of MRSA bacteremia, PE who presents with chronic RLE wound.    - Would not recommend surgical debridement at this time. Appears to have some adherent slough with healthy tissue underneath that would benefit from enzymatic debridement. Recommend ongoing wound care.  - Explained to patient that he should not put neosporin on wound and he may need a different pair of boots while this wound heals.   - Cultures negative to date  - MRI pending for evaluation of underlying osteo      VTE Risk Mitigation (From admission, onward)         Ordered     apixaban tablet 10 mg  2 times daily         09/24/23 5433                Thank you for your consult.    Fartun Rodriguez MD  General Surgery  [facility] - Med Surg

## 2023-09-26 NOTE — ASSESSMENT & PLAN NOTE
- US RLE with no evidence of hemodynamically significant venous reflux in the right greater or lesser saphenous vein. Nonocclusive DVT in the femoral and popliteal veins with collateral vessel formation, which may be chronic  -Continue eliquis (hx of PE in 05/2023)

## 2023-09-26 NOTE — ASSESSMENT & PLAN NOTE
"43M with h/o PE admitted 9/24 with several months of a progressive R lower leg wound- warmth, foul smelling drainage. Bcx 9/2 with MSSA and acenitobacter in one of 2; repeat weren't drawn until 9/24 NGTD. 2d echo without veg. Surgery not recommending debridment, wound care following. R ankle MRI ordered- done, read pending. Has received vanc/zosyn/cipro. ID consulted for "IV antibiotic recommendations, MSSA/acinetobacter bacteremia    Staph aureus can never be disregarded as a contaminant, and pt hasn't had adequate treatment. Suspect R lower leg wound source of bacteremia. Unclear extent of R leg infection- SSTI vs OM, awaiting MRI read    Recommendation:  - stop vancomycin, continue cefepime  - assuming MRI R lower leg does  Not show abscess or osteomyelitis, anticipating 4 weeks of IV antibiotics on d/c for a complicated bacteremia. Note the cefepime could potentially be de-escalated to cefazolin in about 2 weeks (TBD at outpatient f/u)  - wound care as per primary team  - picc (or midline)  - note patient said he's living in a hotel, but he does have refrigerator, so would discuss feasibility of iv abx here with the infusion company prior to d/c    Outpatient Antibiotic Therapy Plan:    Please send referral to [facility].    1) Infection: bacteremia    2) Discharge Antibiotics:    Intravenous antibiotics:   Cefepime 2gm iv q8h (or 6gm daily continuous infusion)    3) Therapy Duration:  4 weeks    Estimated end date of IV antibiotics: 10/21    4) Outpatient Weekly Labs:    Order the following labs to be drawn on Mondays:    CBC   CMP    CRP      5) Fax Lab Results to Infectious Diseases Provider: Dr Robin    [facility] Fax Number: [phone]    6) Outpatient Infectious Diseases Follow-up     Follow-up appointment will be arranged by the ID clinic and will be found in the patient's appointments tab.     Prior to discharge, please ensure the patient's follow-up has " been scheduled.     If there is still no follow-up scheduled prior to discharge, please send an EPIC message to Barbra Montaño in Infectious Diseases.            Discussed assessment/plan with hospitalist

## 2023-09-26 NOTE — SUBJECTIVE & OBJECTIVE
Interval History: No acute overnight events.  Patient remained afebrile.  Blood cultures remained negative to date.  Pain is currently controlled.  Wound appears stable.  Patient is alert and oriented x3.  Wants to go home soon.    Review of Systems   Constitutional:  Negative for chills and fever.   Respiratory:  Negative for cough and shortness of breath.    Cardiovascular:  Negative for chest pain and palpitations.   Gastrointestinal:  Negative for abdominal pain, nausea and vomiting.   Skin:  Positive for wound.     Objective:     Vital Signs (Most Recent):  Temp: 98 °F (36.7 °C) (09/26/23 1109)  Pulse: (!) 59 (09/26/23 1109)  Resp: 18 (09/26/23 1124)  BP: 139/88 (09/26/23 1109)  SpO2: 98 % (09/26/23 1109) Vital Signs (24h Range):  Temp:  [97 °F (36.1 °C)-98.7 °F (37.1 °C)] 98 °F (36.7 °C)  Pulse:  [59-67] 59  Resp:  [15-18] 18  SpO2:  [96 %-99 %] 98 %  BP: (139-168)/(88-98) 139/88     Weight: 80.1 kg (176 lb 9.4 oz)  Body mass index is 22.07 kg/m².    Intake/Output Summary (Last 24 hours) at 9/26/2023 1523  Last data filed at 9/26/2023 1216  Gross per 24 hour   Intake 1080 ml   Output 1300 ml   Net -220 ml         Physical Exam  Vitals and nursing note reviewed.   Constitutional:       Appearance: He is not ill-appearing.   HENT:      Head: Atraumatic.      Mouth/Throat:      Mouth: Mucous membranes are moist.   Cardiovascular:      Rate and Rhythm: Normal rate and regular rhythm.      Pulses: Normal pulses.   Pulmonary:      Effort: Pulmonary effort is normal. No respiratory distress.      Breath sounds: No wheezing.   Abdominal:      General: There is no distension.      Palpations: Abdomen is soft.      Tenderness: There is no abdominal tenderness.   Musculoskeletal:      Comments: Right lower extremity wrapped with gauze/bandaged   Skin:     General: Skin is warm and dry.      Comments: R leg warm. Some drainage from R lower leg, foul smelling and very tender.    Neurological:      General: No focal deficit  present.      Mental Status: He is alert and oriented to person, place, and time.   Psychiatric:         Mood and Affect: Mood normal.         Thought Content: Thought content normal.             Significant Labs: All pertinent labs within the past 24 hours have been reviewed.    Significant Imaging: I have reviewed all pertinent imaging results/findings within the past 24 hours.

## 2023-09-26 NOTE — PROGRESS NOTES
[facility] Medicine  Progress Note    Patient Name: Jesse Fofana  MRN: [id number]  Patient Class: IP- Inpatient   Admission Date: 9/24/2023  Length of Stay: 2 days  Attending Physician: Xochilt Jay DO  Primary Care Provider: Denisha, Primary Doctor        Subjective:     Principal Problem:Cellulitis of right lower extremity        HPI:  Mr. Fofana is a 44 yo M with PMHx of PE, alcohol use who presents to the ED for evaluation of right ankle wound. He initially visited the ED on 7/26 for the wound and was prescribed bactrim, he only took 1 pill after that time. He then returned to ED on 8/16 where he was given a prescription for Ciprofloxacin/Doyxycline and then again on 9/2 where he was given 1 dose of Vanc/Zosyn then PO prescription for Bactrim.  Cultures were drawn on 09/02 and growing MSSA and Aceinetobacer lwoffi group .  Patient was called however no answer so he was never able to be notified.  He comes back today with ongoing drainage and swelling as well as pain. He was shocked to hear his blood cultures were positive the last stay and is in agreement with admission for IV antibiotics. He reports not taking his eliquis recently since admitted in May of this year for acute PE. He denies any fevers, chills or other wounds. He will be admitted to hospital medicine for further management.      Overview/Hospital Course:  43M with h/o PE (Bilateral segmental PE on CT chest 05/09/2023) admitted on 9/24 with several months of a progressive R lower leg wound- warmth, foul smelling drainage. Blood cx from 9/2 with MSSA and acenitobacter in one of 2; repeat blood cx weren't drawn until 9/24, which shows NGTD. Echo without vegetations. MRI right ankle with No evidence for acute osteomyelitis at this time. ID consulted- recommends 4 weeks of IV antibiotics on d/c for a complicated bacteremia. Suspect R lower leg wound source of bacteremia. Vacular surgery consulted and believes wound unlikely to  be of vascular etiology and recommend General surgery consult, who does not recommend surgical debridement at this time. US RLE with no evidence of hemodynamically significant venous reflux in the right greater or lesser saphenous vein. Nonocclusive DVT in the femoral and popliteal veins with collateral vessel formation, which may be chronic. Wound care following.        Interval History: No acute overnight events.  Patient remained afebrile.  Blood cultures remained negative to date.  Pain is currently controlled.  Wound appears stable.  Patient is alert and oriented x3.  Wants to go home soon.    Review of Systems   Constitutional:  Negative for chills and fever.   Respiratory:  Negative for cough and shortness of breath.    Cardiovascular:  Negative for chest pain and palpitations.   Gastrointestinal:  Negative for abdominal pain, nausea and vomiting.   Skin:  Positive for wound.     Objective:     Vital Signs (Most Recent):  Temp: 98 °F (36.7 °C) (09/26/23 1109)  Pulse: (!) 59 (09/26/23 1109)  Resp: 18 (09/26/23 1124)  BP: 139/88 (09/26/23 1109)  SpO2: 98 % (09/26/23 1109) Vital Signs (24h Range):  Temp:  [97 °F (36.1 °C)-98.7 °F (37.1 °C)] 98 °F (36.7 °C)  Pulse:  [59-67] 59  Resp:  [15-18] 18  SpO2:  [96 %-99 %] 98 %  BP: (139-168)/(88-98) 139/88     Weight: 80.1 kg (176 lb 9.4 oz)  Body mass index is 22.07 kg/m².    Intake/Output Summary (Last 24 hours) at 9/26/2023 1523  Last data filed at 9/26/2023 1216  Gross per 24 hour   Intake 1080 ml   Output 1300 ml   Net -220 ml         Physical Exam  Vitals and nursing note reviewed.   Constitutional:       Appearance: He is not ill-appearing.   HENT:      Head: Atraumatic.      Mouth/Throat:      Mouth: Mucous membranes are moist.   Cardiovascular:      Rate and Rhythm: Normal rate and regular rhythm.      Pulses: Normal pulses.   Pulmonary:      Effort: Pulmonary effort is normal. No respiratory distress.      Breath sounds: No wheezing.   Abdominal:      General:  There is no distension.      Palpations: Abdomen is soft.      Tenderness: There is no abdominal tenderness.   Musculoskeletal:      Comments: Right lower extremity wrapped with gauze/bandaged   Skin:     General: Skin is warm and dry.      Comments: R leg warm. Some drainage from R lower leg, foul smelling and very tender.    Neurological:      General: No focal deficit present.      Mental Status: He is alert and oriented to person, place, and time.   Psychiatric:         Mood and Affect: Mood normal.         Thought Content: Thought content normal.             Significant Labs: All pertinent labs within the past 24 hours have been reviewed.    Significant Imaging: I have reviewed all pertinent imaging results/findings within the past 24 hours.      Assessment/Plan:      * Cellulitis of right lower extremity  Presents with unhealing cellulitis to the right lower extremity.  Has been prescribed Bactrim, doxycycline and ciprofloxacin in the past with no improvement.  Unclear if he was actually taking these medications though.     - xray with no joint involvement  - continue with supportive care  - US RLE with no evidence of hemodynamically significant venous reflux in the right greater or lesser saphenous vein. Nonocclusive DVT in the femoral and popliteal veins with collateral vessel formation, which may be chronic.   -Wound care following.  -MRI right ankle: No evidence for acute osteomyelitis at this time. Mixed edema and STIR heterogeneity of deep and superficial soft tissue structures at the ankle.  -ID consulted: anticipating 4 weeks of IV antibiotics on d/c for a complicated bacteremia.   -Blood cx with NGTD  -PICC/midline ordered on 09/26      Bacteremia  -During ED evaluation on 09/02 of this year, blood cultures were drawn and were positive for MSSA and Acinetobacter.  He is not had appropriate treatment.  Recheck blood cultures this admission.  -Check echocardiogram: no vegetations   -MRI right ankle: No  evidence for acute osteomyelitis at this time. Mixed edema and STIR heterogeneity of deep and superficial soft tissue structures at the ankle.  -Suspect R lower leg wound source of bacteremia.   -ID consulted: anticipating 4 weeks of IV antibiotics on d/c for a complicated bacteremia.   -Blood cx with NGTD  -PICC/midline ordered on 09/26      Hypophosphatemia  Patient has Abnormal Phosphorus: hypophosphatemia. Will continue to monitor electrolytes closely. Will replace the affected electrolytes and repeat labs to be done after interventions completed. The patient's phosphorus results have been reviewed and are listed below.  Recent Labs   Lab 09/26/23  0450   PHOS 3.4        -replace as needed    History of pulmonary embolus (PE)  -Diagnosed with acute PE back in May [address]. He was given Eliquis however he states he ran out and never followed up.    -We will start back on loading dose of Eliquis and continue.  Appears unprovoked.      Acute deep vein thrombosis (DVT) of right lower extremity  - US RLE with no evidence of hemodynamically significant venous reflux in the right greater or lesser saphenous vein. Nonocclusive DVT in the femoral and popliteal veins with collateral vessel formation, which may be chronic  -Continue eliquis (hx of PE in 05/2023)    Tobacco use  - Patient was counseled regarding smoking for 3-10 minutes.  - Encourage smoking cessation daily  - Nicotine patch offered inpatient refused at this time.          VTE Risk Mitigation (From admission, onward)         Ordered     apixaban tablet 10 mg  2 times daily         09/24/23 1605                Discharge Planning   SIMA: 9/24/2023     Code Status: Full Code   Is the patient medically ready for discharge?:     Reason for patient still in hospital (select all that apply): Patient trending condition, Treatment and Consult recommendations  Discharge Plan A: Home                  Xochilt Jay DO  Department of Hospital Medicine   [facility]  Surg

## 2023-09-26 NOTE — ASSESSMENT & PLAN NOTE
43 year old male with PMH of MRSA bacteremia, PE who presents with chronic RLE wound.    - Would not recommend surgical debridement at this time. Appears to have some adherent slough with healthy tissue underneath that would benefit from enzymatic debridement. Recommend ongoing wound care.  - Explained to patient that he should not put neosporin on wound and he may need a different pair of boots while this wound heals.   - Cultures negative to date  - MRI pending for evaluation of underlying osteo

## 2023-09-26 NOTE — SUBJECTIVE & OBJECTIVE
No current facility-administered medications on file prior to encounter.     Current Outpatient Medications on File Prior to Encounter   Medication Sig    famotidine (PEPCID) 20 MG tablet Take 1 tablet (20 mg total) by mouth 2 (two) times daily.    meloxicam (MOBIC) 15 MG tablet Take 1 tablet (15 mg total) by mouth once daily.    traMADoL (ULTRAM) 50 mg tablet Take 1 tablet (50 mg total) by mouth every 4 (four) hours as needed for Pain.    ibuprofen (ADVIL,MOTRIN) 600 MG tablet Take 1 tablet (600 mg total) by mouth every 6 (six) hours as needed.       Review of patient's allergies indicates:  No Known Allergies    History reviewed. No pertinent past medical history.  History reviewed. No pertinent surgical history.  Family History    None       Tobacco Use    Smoking status: Every Day     Current packs/day: 1.00     Average packs/day: 1 pack/day for 25.0 years (25.0 ttl pk-yrs)     Types: Cigarettes     Start date: 9/24/1998    Smokeless tobacco: Never   Substance and Sexual Activity    Alcohol use: Yes     Alcohol/week: 3.0 - 4.0 standard drinks of alcohol     Types: 3 - 4 Cans of beer per week     Comment: daily drinker    Drug use: Not Currently    Sexual activity: Not on file     Review of Systems   Constitutional:  Negative for chills and fever.   Respiratory:  Negative for cough and shortness of breath.    Cardiovascular:  Negative for chest pain and palpitations.   Skin:  Positive for wound.     Objective:     Vital Signs (Most Recent):  Temp: 97 °F (36.1 °C) (09/26/23 0522)  Pulse: (!) 59 (09/26/23 0522)  Resp: 17 (09/26/23 0522)  BP: (!) 151/91 (09/26/23 0522)  SpO2: 97 % (09/26/23 0522) Vital Signs (24h Range):  Temp:  [97 °F (36.1 °C)-98 °F (36.7 °C)] 97 °F (36.1 °C)  Pulse:  [59-65] 59  Resp:  [15-18] 17  SpO2:  [96 %-99 %] 97 %  BP: (147-168)/(90-98) 151/91     Weight: 80.1 kg (176 lb 9.4 oz)  Body mass index is 22.07 kg/m².     Physical Exam  Vitals reviewed.   Constitutional:       General: He is not  in acute distress.  HENT:      Head: Normocephalic and atraumatic.   Cardiovascular:      Rate and Rhythm: Normal rate.   Pulmonary:      Effort: Pulmonary effort is normal. No respiratory distress.   Abdominal:      General: There is no distension.      Palpations: Abdomen is soft.      Tenderness: There is no abdominal tenderness.   Skin:     General: Skin is warm and dry.   Neurological:      General: No focal deficit present.      Mental Status: He is alert and oriented to person, place, and time.              I have reviewed all pertinent lab results within the past 24 hours.  CBC:   Recent Labs   Lab 09/26/23  0450   WBC 7.24   RBC 4.52*   HGB 14.1   HCT 44.4      MCV 98   MCH 31.2*   MCHC 31.8*     CMP:   Recent Labs   Lab 09/26/23  0450   *   CALCIUM 9.0   ALBUMIN 2.9*   PROT 7.1      K 4.3   CO2 28      BUN 7   CREATININE 0.8   ALKPHOS 55   ALT 8*   AST 16   BILITOT 0.4       Significant Diagnostics:  I have reviewed all pertinent imaging results/findings within the past 24 hours.

## 2023-09-26 NOTE — ASSESSMENT & PLAN NOTE
- Patient was counseled regarding smoking for 3-10 minutes.  - Encourage smoking cessation daily  - Nicotine patch offered inpatient refused at this time.

## 2023-09-26 NOTE — SUBJECTIVE & OBJECTIVE
Interval history: NAEO. Foot/leg pain and swelling improving. Tolerating abx and agreeable to iv abx at d/c. Had MRI, awaiting read    History reviewed. No pertinent surgical history.    Review of patient's allergies indicates:  No Known Allergies    No current facility-administered medications on file prior to encounter.     Current Outpatient Medications on File Prior to Encounter   Medication Sig    famotidine (PEPCID) 20 MG tablet Take 1 tablet (20 mg total) by mouth 2 (two) times daily.    meloxicam (MOBIC) 15 MG tablet Take 1 tablet (15 mg total) by mouth once daily.    traMADoL (ULTRAM) 50 mg tablet Take 1 tablet (50 mg total) by mouth every 4 (four) hours as needed for Pain.    ibuprofen (ADVIL,MOTRIN) 600 MG tablet Take 1 tablet (600 mg total) by mouth every 6 (six) hours as needed.     Family History    None       Tobacco Use    Smoking status: Every Day     Current packs/day: 1.00     Average packs/day: 1 pack/day for 25.0 years (25.0 ttl pk-yrs)     Types: Cigarettes     Start date: 9/24/1998    Smokeless tobacco: Never   Substance and Sexual Activity    Alcohol use: Yes     Alcohol/week: 3.0 - 4.0 standard drinks of alcohol     Types: 3 - 4 Cans of beer per week     Comment: daily drinker    Drug use: Not Currently    Sexual activity: Not on file     Review of Systems   Constitutional:  Negative for chills and fever.   Respiratory:  Negative for cough and shortness of breath.      Objective:     Vital Signs (Most Recent):  Temp: 98 °F (36.7 °C) (09/26/23 1109)  Pulse: (!) 59 (09/26/23 1109)  Resp: 18 (09/26/23 1124)  BP: 139/88 (09/26/23 1109)  SpO2: 98 % (09/26/23 1109) Vital Signs (24h Range):  Temp:  [97 °F (36.1 °C)-98.7 °F (37.1 °C)] 98 °F (36.7 °C)  Pulse:  [59-67] 59  Resp:  [15-18] 18  SpO2:  [96 %-99 %] 98 %  BP: (139-168)/(88-98) 139/88     Weight: 80.1 kg (176 lb 9.4 oz)  Body mass index is 22.07 kg/m².     Physical Exam  Vitals and nursing note reviewed.   Constitutional:       Appearance:  He is not ill-appearing.   HENT:      Head: Normocephalic and atraumatic.   Cardiovascular:      Rate and Rhythm: Normal rate and regular rhythm.      Pulses: Normal pulses.      Heart sounds: No murmur heard.  Pulmonary:      Effort: Pulmonary effort is normal. No respiratory distress.   Abdominal:      General: Bowel sounds are normal. There is no distension.      Tenderness: There is no abdominal tenderness.   Musculoskeletal:      Comments: Right lower extremity wrapped with gauze/bandaged  -    Skin:     Comments: R leg warm. Copious drainage from R lower leg, foul smelling and very tender. Palpable fluid collection   Neurological:      General: No focal deficit present.      Mental Status: He is alert and oriented to person, place, and time.   Psychiatric:         Mood and Affect: Mood normal.         Thought Content: Thought content normal.                  Significant Labs: All pertinent labs within the past 24 hours have been reviewed.    Significant Imaging: I have reviewed all pertinent imaging results/findings within the past 24 hours.

## 2023-09-26 NOTE — PLAN OF CARE
Problem: Adult Inpatient Plan of Care  Goal: Plan of Care Review  Outcome: Ongoing, Progressing     Problem: Pain Acute  Goal: Acceptable Pain Control and Functional Ability  Outcome: Ongoing, Progressing     Problem: Infection  Goal: Absence of Infection Signs and Symptoms  Outcome: Ongoing, Progressing     Problem: Impaired Wound Healing  Goal: Optimal Wound Healing  Outcome: Ongoing, Progressing

## 2023-09-26 NOTE — ASSESSMENT & PLAN NOTE
Presents with unhealing cellulitis to the right lower extremity.  Has been prescribed Bactrim, doxycycline and ciprofloxacin in the past with no improvement.  Unclear if he was actually taking these medications though.     - xray with no joint involvement  - continue with supportive care  - US RLE with no evidence of hemodynamically significant venous reflux in the right greater or lesser saphenous vein. Nonocclusive DVT in the femoral and popliteal veins with collateral vessel formation, which may be chronic.   -Wound care following.  -MRI right ankle: No evidence for acute osteomyelitis at this time. Mixed edema and STIR heterogeneity of deep and superficial soft tissue structures at the ankle.  -ID consulted: anticipating 4 weeks of IV antibiotics on d/c for a complicated bacteremia.   -Blood cx with NGTD  -PICC/midline ordered on 09/26

## 2023-09-26 NOTE — NURSING
Pt arrived back to the floor via wheelchair.  Tele box applied to the pt, pt reporting pain, PRN medication given.  Dressing to left leg intact, no distress noted.    [facility] [place]  Nurses Note -- 4 Eyes      9/25/2023       Skin assessed on: Q Shift      [x] No Pressure Injuries Present    [x]Prevention Measures Documented    [] Yes LDA  for Pressure Injury Previously documented     [] Yes New Pressure Injury Discovered   [] LDA for New Pressure Injury Added      Attending RN:  Jalyn Rodriges RN     Second RN:  JUAN DAVID Charles

## 2023-09-26 NOTE — CONSULTS
"[facility] - Med Surg  Infectious Disease  Consult Note    Patient Name: Jesse Fofana  MRN: [id number]  Admission Date: 9/24/2023  Hospital Length of Stay: 2 days  Attending Physician: oXchilt Jay DO  Primary Care Provider: Denisha, Primary Doctor     Isolation Status: No active isolations    Patient information was obtained from patient and ER records.      Consults  Assessment/Plan:     ID  Bacteremia  43M with h/o PE admitted 9/24 with several months of a progressive R lower leg wound- warmth, foul smelling drainage. Bcx 9/2 with MSSA and acenitobacter in one of 2; repeat weren't drawn until 9/24 NGTD. 2d echo without veg. Surgery not recommending debridment, wound care following. R ankle MRI ordered- done, read pending. Has received vanc/zosyn/cipro. ID consulted for "IV antibiotic recommendations, MSSA/acinetobacter bacteremia    Staph aureus can never be disregarded as a contaminant, and pt hasn't had adequate treatment. Suspect R lower leg wound source of bacteremia. Unclear extent of R leg infection- SSTI vs OM, awaiting MRI read    Recommendation:  - stop vancomycin, continue cefepime  - assuming MRI R lower leg does  Not show abscess or osteomyelitis, anticipating 4 weeks of IV antibiotics on d/c for a complicated bacteremia. Note the cefepime could potentially be de-escalated to cefazolin in about 2 weeks (TBD at outpatient f/u)  - wound care as per primary team  - picc (or midline)  - note patient said he's living in a hotel, but he does have refrigerator, so would discuss feasibility of iv abx here with the infusion company prior to d/c    Outpatient Antibiotic Therapy Plan:    Please send referral to [facility].    1) Infection: bacteremia    2) Discharge Antibiotics:    Intravenous antibiotics:   Cefepime 2gm iv q8h (or 6gm daily continuous infusion)    3) Therapy Duration:  4 weeks    Estimated end date of IV antibiotics: 10/21    4) Outpatient Weekly " Labs:    Order the following labs to be drawn on Mondays:    CBC   CMP    CRP      5) Fax Lab Results to Infectious Diseases Provider: Dr Robin    [facility] Fax Number: [phone]    6) Outpatient Infectious Diseases Follow-up     Follow-up appointment will be arranged by the ID clinic and will be found in the patient's appointments tab.     Prior to discharge, please ensure the patient's follow-up has been scheduled.     If there is still no follow-up scheduled prior to discharge, please send an EPIC message to Barbra Montaño in Infectious Diseases.            Discussed assessment/plan with hospitalist          Thank you for your consult. I will follow-up with patient. Please contact us if you have any additional questions.    Nery Robin MD  Infectious Disease  [facility] - Med Surg    Subjective:     Principal Problem: Cellulitis of right lower extremity    HPI:   43M with h/o PE (stopped eliquis)  admitted 9/24 with R ankle wound. Reports several ED visits for the same. Was given several different antibiotics including bactrim, cipro, dox at prior visits, but didn't complete. 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 Wears steel toe boots. Reports drainage from R lower leg wound recently. Reports leg warm. Says he has hardware in L leg, but denies R leg hardware. Denies f/c.       Bcx 9/2 with MSSA and acenitobacter in one of 2; repeat weren't drawn until 9/24 NGTD    2d echo ordered, pending    Blood culture #1 **CANNOT BE ORDERED STAT** [[id number]] Collected: 09/24/23 1121   Order Status: Completed Specimen: Blood from Peripheral, Upper Arm, Right Updated: 09/24/23 2112    Blood Culture, Routine No Growth to date   Blood culture #2 **CANNOT BE ORDERED STAT** [[id number]] Collected: 09/24/23 1121   Order Status: Completed Specimen: Blood from Peripheral, Antecubital, Right Updated: 09/24/23 2112    Blood Culture, Routine No Growth to date  "      Blood culture [[id number]] Collected: 09/02/23 1656   Order Status: Completed Specimen: Blood from Peripheral, Antecubital, Left Updated: 09/06/23 1903    Blood Culture, Routine No Growth after 4 days.   Blood culture [[id number]] (Abnormal)  Collected: 09/02/23 1656   Order Status: Completed Specimen: Blood from Peripheral, Antecubital, Left Updated: 09/06/23 0742    Blood Culture, Routine Gram stain aer bottle: Gram positive cocci in pairs     Results called to and read back by: Dr. Moreno 09/03/2023  09:38     Gram stain meryl bottle: Gram positive cocci     Positive results previously called     Gram stain aer bottle: Gram positive cocci in pairs Gram negative rods     Results reviewed and amended. Called to and read back by: Mariposa Bhakta     09/04/2023  11:34     ACINETOBACTER LWOFFII GROUP Abnormal      STAPHYLOCOCCUS AUREUS Abnormal    Susceptibility     Acinetobacter lwoffii group Staphylococcus aureus     CULTURE, BLOOD CULTURE, BLOOD     Amikacin <=16 mcg/mL Sensitive       Amp/Sulbactam <=8/4 mcg/mL Sensitive       Cefepime <=2 mcg/mL Sensitive       Ceftriaxone 8 mcg/mL Sensitive       Ciprofloxacin <=1 mcg/mL Sensitive       Clindamycin   <=0.5 mcg/mL Resistant     Erythromycin   >4 mcg/mL Resistant     Gentamicin <=4 mcg/mL Sensitive       Levofloxacin <=2 mcg/mL Sensitive       Meropenem <=1 mcg/mL Sensitive       Minocycline <=4 mcg/mL Sensitive       Oxacillin   0.5 mcg/mL Sensitive     Penicillin   >8 mcg/mL Resistant     Tetracycline <=4 mcg/mL Sensitive <=4 mcg/mL Sensitive     Tobramycin <=4 mcg/mL Sensitive       Trimeth/Sulfa >2/38 mcg/mL Resistant <=0.5/9.5 m... Sensitive     Vancomycin   2 mcg/mL Sensitive            Vascular consult pending    MRI R ankle ordered    Wound care following        Has received vanc/zosyn/cipro    ID consulted for "IV antibiotic recommendations, MSSA/acinetobacter bacteremia      Interval history: NAEO. Foot/leg pain and swelling improving. Tolerating abx and " agreeable to iv abx at d/c. Had MRI, awaiting read    History reviewed. No pertinent surgical history.    Review of patient's allergies indicates:  No Known Allergies    No current facility-administered medications on file prior to encounter.     Current Outpatient Medications on File Prior to Encounter   Medication Sig    famotidine (PEPCID) 20 MG tablet Take 1 tablet (20 mg total) by mouth 2 (two) times daily.    meloxicam (MOBIC) 15 MG tablet Take 1 tablet (15 mg total) by mouth once daily.    traMADoL (ULTRAM) 50 mg tablet Take 1 tablet (50 mg total) by mouth every 4 (four) hours as needed for Pain.    ibuprofen (ADVIL,MOTRIN) 600 MG tablet Take 1 tablet (600 mg total) by mouth every 6 (six) hours as needed.     Family History    None       Tobacco Use    Smoking status: Every Day     Current packs/day: 1.00     Average packs/day: 1 pack/day for 25.0 years (25.0 ttl pk-yrs)     Types: Cigarettes     Start date: 9/24/1998    Smokeless tobacco: Never   Substance and Sexual Activity    Alcohol use: Yes     Alcohol/week: 3.0 - 4.0 standard drinks of alcohol     Types: 3 - 4 Cans of beer per week     Comment: daily drinker    Drug use: Not Currently    Sexual activity: Not on file     Review of Systems   Constitutional:  Negative for chills and fever.   Respiratory:  Negative for cough and shortness of breath.      Objective:     Vital Signs (Most Recent):  Temp: 98 °F (36.7 °C) (09/26/23 1109)  Pulse: (!) 59 (09/26/23 1109)  Resp: 18 (09/26/23 1124)  BP: 139/88 (09/26/23 1109)  SpO2: 98 % (09/26/23 1109) Vital Signs (24h Range):  Temp:  [97 °F (36.1 °C)-98.7 °F (37.1 °C)] 98 °F (36.7 °C)  Pulse:  [59-67] 59  Resp:  [15-18] 18  SpO2:  [96 %-99 %] 98 %  BP: (139-168)/(88-98) 139/88     Weight: 80.1 kg (176 lb 9.4 oz)  Body mass index is 22.07 kg/m².     Physical Exam  Vitals and nursing note reviewed.   Constitutional:       Appearance: He is not ill-appearing.   HENT:      Head: Normocephalic and atraumatic.    Cardiovascular:      Rate and Rhythm: Normal rate and regular rhythm.      Pulses: Normal pulses.      Heart sounds: No murmur heard.  Pulmonary:      Effort: Pulmonary effort is normal. No respiratory distress.   Abdominal:      General: Bowel sounds are normal. There is no distension.      Tenderness: There is no abdominal tenderness.   Musculoskeletal:      Comments: Right lower extremity wrapped with gauze/bandaged  -    Skin:     Comments: R leg warm. Copious drainage from R lower leg, foul smelling and very tender. Palpable fluid collection   Neurological:      General: No focal deficit present.      Mental Status: He is alert and oriented to person, place, and time.   Psychiatric:         Mood and Affect: Mood normal.         Thought Content: Thought content normal.                  Significant Labs: All pertinent labs within the past 24 hours have been reviewed.    Significant Imaging: I have reviewed all pertinent imaging results/findings within the past 24 hours.

## 2023-09-26 NOTE — PROGRESS NOTES
Therapy with vancomycin complete and/or consult discontinued by provider.  Pharmacy will sign off, please re-consult as needed.

## 2023-09-26 NOTE — PLAN OF CARE
Case Management Assessment     PCP: [facility]  Pharmacy:   [id number] - [place], [place] - [address] [facility] [address] [facility] & [address]  [place] [address]  Phone: [phone] Fax: [phone]        Patient Arrived From: hotel  Existing Help at Home: friend    Barriers to Discharge: Requires medical care    Discharge Plan:    A. Home health and home infusion   B. Home infusion      Working here and living in [employer] [place] on [address] [place], [place] [address].  Says his girlfriend with assist with IV ABXs.   TN Will schedule follow up appt with the [facility].  TN sent Home IV infusion to [facility]. Patient needs a piccline placed.TN will continue to assess for dc planning.         09/26/23 1658   Discharge Reassessment   Assessment Type Discharge Planning Reassessment   Did the patient's condition or plan change since previous assessment? Yes   Discharge Plan discussed with: Patient   Communicated SIMA with patient/caregiver No   Discharge Plan A Home;Home Health  ([facility])   Discharge Plan B Home;Home with family   DME Needed Upon Discharge  none   Why the patient remains in the hospital Requires continued medical care   Post-Acute Status   Post-Acute Authorization Home Health;IV Infusion   Home Health Status Pending medical clearance/testing   IV Infusion Status Pending payor review/awaiting authorization (if required)   Discharge Delays (!) Procedure Scheduling (IR, OR, Labs, Echo, Cath, Echo, EEG)

## 2023-09-26 NOTE — PROGRESS NOTES
Pharmacokinetic Assessment Follow Up: IV Vancomycin    Vancomycin serum concentration assessment(s):    The trough level was drawn correctly and can be used to guide therapy at this time. The measurement is within the desired definitive target range of 10 to 20 mcg/mL.    Vancomycin Regimen Plan:    Continue regimen to Vancomycin 1500 mg IV every 12 hours with next serum trough concentration measured at 60 min prior to 4th  dose on 9/27 at 1230    Drug levels (last 3 results):  Recent Labs   Lab Result Units 09/26/23  0035   Vancomycin-Trough ug/mL 10.2       Pharmacy will continue to follow and monitor vancomycin.    Please contact pharmacy at extension [phone] for questions regarding this assessment.    Thank you for the consult,   Tres Ann       Patient brief summary:  Jesse Fofana is a 43 y.o. male initiated on antimicrobial therapy with IV Vancomycin for treatment of skin & soft tissue infection    Drug Allergies:   Review of patient's allergies indicates:  No Known Allergies    Actual Body Weight:   80.1 kg    Renal Function:   Estimated Creatinine Clearance: 137.7 mL/min (based on SCr of 0.8 mg/dL).,     Dialysis Method (if applicable):  N/A    CBC (last 72 hours):  Recent Labs   Lab Result Units 09/24/23  1121 09/25/23  0449   WBC K/uL 8.98 7.80   Hemoglobin g/dL 14.0 14.1   Hematocrit % 41.9 43.0   Platelets K/uL 255 258   Gran % % 63.3 76.5*   Lymph % % 28.1 16.9*   Mono % % 6.2 5.1   Eosinophil % % 1.8 0.8   Basophil % % 0.4 0.3   Differential Method  Automated Automated       Metabolic Panel (last 72 hours):  Recent Labs   Lab Result Units 09/24/23  1121 09/25/23  0449   Sodium mmol/L 141 138   Potassium mmol/L 4.1 4.1   Chloride mmol/L 107 103   CO2 mmol/L 24 27   Glucose mg/dL 91 111*   BUN mg/dL 8 9   Creatinine mg/dL 0.7 0.8   Albumin g/dL 3.5 3.1*   Total Bilirubin mg/dL 0.2 0.6   Alkaline Phosphatase U/L 61 59   AST U/L 23 16   ALT U/L 13 9*   Magnesium mg/dL  --  2.0   Phosphorus  mg/dL  --  2.4*       Vancomycin Administrations:  vancomycin given in the last 96 hours                     vancomycin 1,500 mg in dextrose 5 % (D5W) 250 mL IVPB (Vial-Mate) (mg) 1,500 mg New Bag 09/25/23 1232     1,500 mg New Bag  0304    vancomycin (VANCOCIN) 1,750 mg in dextrose 5 % (D5W) 500 mL IVPB (mg) 1,750 mg New Bag 09/24/23 1331                    Microbiologic Results:  Microbiology Results (last 7 days)       Procedure Component Value Units Date/Time    Blood culture #1 **CANNOT BE ORDERED STAT** [[id number]] Collected: 09/24/23 1121    Order Status: Completed Specimen: Blood from Peripheral, Upper Arm, Right Updated: 09/25/23 1503     Blood Culture, Routine No Growth to date      No Growth to date    Blood culture #2 **CANNOT BE ORDERED STAT** [[id number]] Collected: 09/24/23 1121    Order Status: Completed Specimen: Blood from Peripheral, Antecubital, Right Updated: 09/25/23 1503     Blood Culture, Routine No Growth to date      No Growth to date    Aerobic culture (Specify Source) **CANNOT BE ORDERED AS STAT** [[id number]]     Order Status: No result Specimen: Wound from Ankle, Right

## 2023-09-27 VITALS
HEIGHT: 75 IN | RESPIRATION RATE: 20 BRPM | HEART RATE: 73 BPM | SYSTOLIC BLOOD PRESSURE: 140 MMHG | DIASTOLIC BLOOD PRESSURE: 92 MMHG | TEMPERATURE: 39 F | WEIGHT: 176.56 LBS | BODY MASS INDEX: 21.95 KG/M2 | OXYGEN SATURATION: 100 %

## 2023-09-27 PROCEDURE — 25000003 PHARM REV CODE 250: Performed by: INTERNAL MEDICINE

## 2023-09-27 PROCEDURE — 25000003 PHARM REV CODE 250: Performed by: STUDENT IN AN ORGANIZED HEALTH CARE EDUCATION/TRAINING PROGRAM

## 2023-09-27 PROCEDURE — A4216 STERILE WATER/SALINE, 10 ML: HCPCS | Performed by: STUDENT IN AN ORGANIZED HEALTH CARE EDUCATION/TRAINING PROGRAM

## 2023-09-27 PROCEDURE — 99232 PR SUBSEQUENT HOSPITAL CARE,LEVL II: ICD-10-PCS | Mod: ,,, | Performed by: INTERNAL MEDICINE

## 2023-09-27 PROCEDURE — 99232 SBSQ HOSP IP/OBS MODERATE 35: CPT | Mod: ,,, | Performed by: INTERNAL MEDICINE

## 2023-09-27 PROCEDURE — 63600175 PHARM REV CODE 636 W HCPCS: Performed by: INTERNAL MEDICINE

## 2023-09-27 RX ORDER — HYDROCODONE BITARTRATE AND ACETAMINOPHEN 10; 325 MG/1; MG/1
1 TABLET ORAL EVERY 8 HOURS PRN
Qty: 10 TABLET | Refills: 0 | Status: SHIPPED | OUTPATIENT
Start: 2023-09-27

## 2023-09-27 RX ADMIN — OXYCODONE HYDROCHLORIDE 5 MG: 5 TABLET ORAL at 09:09

## 2023-09-27 RX ADMIN — Medication 10 ML: at 12:09

## 2023-09-27 RX ADMIN — HYDROCODONE BITARTRATE AND ACETAMINOPHEN 1 TABLET: 10; 325 TABLET ORAL at 09:09

## 2023-09-27 RX ADMIN — APIXABAN 10 MG: 5 TABLET, FILM COATED ORAL at 08:09

## 2023-09-27 RX ADMIN — Medication 10 ML: at 06:09

## 2023-09-27 RX ADMIN — CEFEPIME 2 G: 2 INJECTION, POWDER, FOR SOLUTION INTRAVENOUS at 06:09

## 2023-09-27 NOTE — NURSING
Report received and care assumed. Discussed plan of care and safety with patient . Reviewed call system. No acute distress noted [facility]  Nurses Note -- 4 Eyes      9/27/2023       Skin assessed on: Q Shift      [x] No Pressure Injuries Present    [x]Prevention Measures Documented    [] Yes LDA  for Pressure Injury Previously documented     [] Yes New Pressure Injury Discovered   [] LDA for New Pressure Injury Added      Attending RN:  Mony Kennedy RN     Second RN: Jalyn Rodriges RN

## 2023-09-27 NOTE — ASSESSMENT & PLAN NOTE
"43M with h/o PE admitted 9/24 with several months of a progressive R lower leg wound- warmth, foul smelling drainage. Bcx 9/2 with MSSA and acenitobacter in one of 2; repeat weren't drawn until 9/24 NGTD. 2d echo without veg. Surgery not recommending debridment, wound care following. R ankle MRI ordered- done, read pending. Has received vanc/zosyn/cipro. ID consulted for "IV antibiotic recommendations, MSSA/acinetobacter bacteremia    Staph aureus can never be disregarded as a contaminant, and pt hasn't had adequate treatment. Suspect R lower leg wound source of bacteremia. Fortunately no abscess or osteo seen on MRI of R lower leg. Treating complicated bacteremia from SSTI source    Recommendation:  - continue cefepime with plan for 4 weeks total  - wound care as per primary team  - picc (or midline)  - note patient said he's living in a hotel, but he does have refrigerator, so would discuss feasibility of iv abx here with the infusion company prior to d/c    Outpatient Antibiotic Therapy Plan:    Please send referral to [facility].    1) Infection: bacteremia    2) Discharge Antibiotics:    Intravenous antibiotics:   Cefepime 2gm iv q8h (or 6gm daily continuous infusion)    3) Therapy Duration:  4 weeks    Estimated end date of IV antibiotics: 10/21    4) Outpatient Weekly Labs:    Order the following labs to be drawn on Mondays:    CBC   CMP    CRP      5) Fax Lab Results to Infectious Diseases Provider: Dr Robin    [facility] Fax Number: [phone]    6) Outpatient Infectious Diseases Follow-up     Follow-up appointment will be arranged by the ID clinic and will be found in the patient's appointments tab.     Prior to discharge, please ensure the patient's follow-up has been scheduled.     If there is still no follow-up scheduled prior to discharge, please send an EPIC message to Barbra Montaño in Infectious Diseases.            Discussed assessment/plan with " hospitalist

## 2023-09-27 NOTE — DISCHARGE SUMMARY
[facility] Medicine  Discharge Summary      Patient Name: Jesse Fofana  MRN: [id number]  [facility]: [id number]  Patient Class: IP- Inpatient  Admission Date: 9/24/2023  Hospital Length of Stay: 3 days  Discharge Date and Time: 9/27/2023  1:08 PM  Attending Physician: Denisha att. providers found   Discharging Provider: Xochilt Jay DO  Primary Care Provider: Denisha, Primary Doctor    Primary Care Team: Networked reference to record PCT     HPI:   Mr. Fofana is a 42 yo M with PMHx of PE, alcohol use who presents to the ED for evaluation of right ankle wound. He initially visited the ED on 7/26 for the wound and was prescribed bactrim, he only took 1 pill after that time. He then returned to ED on 8/16 where he was given a prescription for Ciprofloxacin/Doyxycline and then again on 9/2 where he was given 1 dose of Vanc/Zosyn then PO prescription for Bactrim.  Cultures were drawn on 09/02 and growing MSSA and Aceinetobacer lwoffi group .  Patient was called however no answer so he was never able to be notified.  He comes back today with ongoing drainage and swelling as well as pain. He was shocked to hear his blood cultures were positive the last stay and is in agreement with admission for IV antibiotics. He reports not taking his eliquis recently since admitted in May of this year for acute PE. He denies any fevers, chills or other wounds. He will be admitted to hospital medicine for further management.      * No surgery found *      Hospital Course:   43M with h/o PE (Bilateral segmental PE on CT chest 05/09/2023) admitted on 9/24 with several months of a progressive R lower leg wound- warmth, foul smelling drainage. Blood cx from 9/2 with MSSA and acenitobacter in one of 2; repeat blood cx weren't drawn until 9/24, which shows NGTD. Echo without vegetations. MRI right ankle with No evidence for acute osteomyelitis at this time. ID consulted- recommends 4 weeks of IV cefepime on d/c for a complicated  bacteremia. LENARD 10/21/2023. Suspect R lower leg wound source of bacteremia. Vacular surgery consulted and believes wound unlikely to be of vascular etiology and recommend General surgery consult, who does not recommend surgical debridement at this time. US RLE with no evidence of hemodynamically significant venous reflux in the right greater or lesser saphenous vein. Nonocclusive DVT in the femoral and popliteal veins with collateral vessel formation, which may be chronic. Wound care consulted.     Patient admits feeling better overall.  Pain is well controlled at this time.  PICC line placed on 09/26/2023. Pt denies any fever, headaches, vision changes, chest pain, shortness of breath, palpitations, abdominal pain, nausea, vomiting, or any new weaknesses. Feels ready to go home. Patient's exam on discharge was as follow: Patient is alert and oriented, appears in no acute distress, heart with regular rate and rhythm, lungs clear to asculation with non-labored breathing, abdomen soft, and no new weaknesses or focal deficits seen. Bilateral lower extremities without any edema or calf tenderness. Right posterior lower extremity ulcer with moderate amount yellow drainage. Base of wound covered with yellow slough. Painful to touch    Patient was counseled regarding any abnormal labs, differential diagnosis, treatment options, risk-benefit, lifestyle changes, current condition, and medications. Patient was interactive and attentive.  Patient's questions were answered in a respectful and timely manner. Patient was instructed to follow-up with PCP within 1 week and to continue taking medications as prescribed.  Instructed to also follow up with infectious disease and wound care. Also, extensively discussed the risks, benefits, and side effects of patient's medications. Discussed with patient about any medication changes. Patient verbalized understanding and agrees to treatment plan.  Patient is stable for discharge.   Patient has no other questions or concerns at this time.  ED precautions discussed with the patient.    Vital signs are stable. Ambulating without any difficulty. Tolerating p.o. intake without any nausea or vomiting. Afebrile for over 24 hours. Patient is in stable condition and has no questions or concerns. Patient will be discharge to home with home health (wound care and home IV infusion abx) once transportation secured . Prescriptions sent to pharmacy.  CM/SW to assist with discharge planning.     Vitals:    09/27/23 0902 09/27/23 0937 09/27/23 1054   BP:   (!) 140/92   BP Location:      Patient Position:   Lying   Pulse:   73   Resp: 18 18 20   Temp:   97.6 °F (36.4 °C)   TempSrc:   Oral   SpO2:   100%   Weight:      Height:                 Goals of Care Treatment Preferences:  Code Status: Full Code      Consults:   Consults (From admission, onward)        Status Ordering Provider     Inpatient consult to General Surgery  Once        Provider:  Fartun Rodriguez MD    Completed JUMANA VALERIO.     Inpatient consult to Infectious Diseases  Once        Provider:  Josh Bell MD    Completed KATHY CROSS     Inpatient consult to Vascular Surgery  Once        Provider:  Josh Devlin MD    Completed KATHY CROSS          No new Assessment & Plan notes have been filed under this hospital service since the last note was generated.  Service: Hospital Medicine    Final Active Diagnoses:    Diagnosis Date Noted POA    PRINCIPAL PROBLEM:  Cellulitis of right lower extremity [L03.115] 09/24/2023 Yes    Bacteremia [R78.81] 09/24/2023 Yes    Hypophosphatemia [E83.39] 09/26/2023 Yes    History of pulmonary embolus (PE) [Z86.711] 09/24/2023 Yes    Acute deep vein thrombosis (DVT) of right lower extremity [I82.401] 09/26/2023 Yes    Tobacco use [Z72.0] 10/24/2016 Yes      Problems Resolved During this Admission:       Discharged Condition: stable    Disposition: Home or Self Care    Follow  Up:   Follow-up Information     [facility]. Schedule an appointment as soon as possible for a visit on 9/29/2023.    Why: Kurt Gray NP 9:30 AM to establish care and hospital follow up.  Contact information:  [phone]  [address] [place] [address] [address]   [address]  [place] [id number]           Nery Robin MD Follow up in 2 week(s).    Specialty: Infectious Diseases  Contact information:  [address] [facility] [address]  [phone]             [facility], [facility] Follow up on 9/24/2023.    Specialty: Infusion Provider  Why: Infusion  Contact information:  [address] [facility] [address]  [facility] [id number] [phone]                       Patient Instructions:      Diet Adult Regular     Notify your health care provider if you experience any of the following:  temperature >100.4     Notify your health care provider if you experience any of the following:  severe uncontrolled pain     Notify your health care provider if you experience any of the following:  redness, tenderness, or signs of infection (pain, swelling, redness, odor or green/yellow discharge around incision site)     Notify your health care provider if you experience any of the following:  increased confusion or weakness     Activity as tolerated       Significant Diagnostic Studies: Labs: All labs within the past 24 hours have been reviewed       Recent Results (from the past 100 hour(s))   CBC auto differential    Collection Time: 09/24/23 11:21 AM   Result Value Ref Range    WBC 8.98 3.90 - 12.70 K/uL    RBC 4.52 (L) 4.60 - 6.20 M/uL    Hemoglobin 14.0 14.0 - 18.0 g/dL    Hematocrit 41.9 40.0 - 54.0 %    MCV 93 82 - 98 fL    MCH 31.0 27.0 - 31.0 pg    MCHC 33.4 32.0 - 36.0 g/dL    RDW 13.8 11.5 - 14.5 %    Platelets 255 150 - 450 K/uL    MPV 9.0 (L) 9.2 - 12.9 fL    Immature Granulocytes 0.2 0.0 - 0.5 %    Gran # (ANC) 5.7 1.8 - 7.7 K/uL    Immature Grans (Abs) 0.02 0.00 - 0.04 K/uL    Lymph # 2.5 1.0 -  4.8 K/uL    Mono # 0.6 0.3 - 1.0 K/uL    Eos # 0.2 0.0 - 0.5 K/uL    Baso # 0.04 0.00 - 0.20 K/uL    nRBC 0 0 /100 WBC    Gran % 63.3 38.0 - 73.0 %    Lymph % 28.1 18.0 - 48.0 %    Mono % 6.2 4.0 - 15.0 %    Eosinophil % 1.8 0.0 - 8.0 %    Basophil % 0.4 0.0 - 1.9 %    Differential Method Automated    Comprehensive metabolic panel    Collection Time: 09/24/23 11:21 AM   Result Value Ref Range    Sodium 141 136 - 145 mmol/L    Potassium 4.1 3.5 - 5.1 mmol/L    Chloride 107 95 - 110 mmol/L    CO2 24 23 - 29 mmol/L    Glucose 91 70 - 110 mg/dL    BUN 8 6 - 20 mg/dL    Creatinine 0.7 0.5 - 1.4 mg/dL    Calcium 8.7 8.7 - 10.5 mg/dL    Total Protein 7.9 6.0 - 8.4 g/dL    Albumin 3.5 3.5 - 5.2 g/dL    Total Bilirubin 0.2 0.1 - 1.0 mg/dL    Alkaline Phosphatase 61 55 - 135 U/L    AST 23 10 - 40 U/L    ALT 13 10 - 44 U/L    eGFR >60 >60 mL/min/1.73 m^2    Anion Gap 10 8 - 16 mmol/L   Blood culture #1 **CANNOT BE ORDERED STAT**    Collection Time: 09/24/23 11:21 AM    Specimen: Peripheral, Upper Arm, Right; Blood   Result Value Ref Range    Blood Culture, Routine No Growth to date     Blood Culture, Routine No Growth to date     Blood Culture, Routine No Growth to date     Blood Culture, Routine No Growth to date    Blood culture #2 **CANNOT BE ORDERED STAT**    Collection Time: 09/24/23 11:21 AM    Specimen: Peripheral, Antecubital, Right; Blood   Result Value Ref Range    Blood Culture, Routine No Growth to date     Blood Culture, Routine No Growth to date     Blood Culture, Routine No Growth to date     Blood Culture, Routine No Growth to date    Procalcitonin    Collection Time: 09/24/23  6:28 PM   Result Value Ref Range    Procalcitonin 0.08 <0.25 ng/mL   Comprehensive Metabolic Panel (CMP)    Collection Time: 09/25/23  4:49 AM   Result Value Ref Range    Sodium 138 136 - 145 mmol/L    Potassium 4.1 3.5 - 5.1 mmol/L    Chloride 103 95 - 110 mmol/L    CO2 27 23 - 29 mmol/L    Glucose 111 (H) 70 - 110 mg/dL    BUN 9 6 - 20  mg/dL    Creatinine 0.8 0.5 - 1.4 mg/dL    Calcium 8.7 8.7 - 10.5 mg/dL    Total Protein 7.2 6.0 - 8.4 g/dL    Albumin 3.1 (L) 3.5 - 5.2 g/dL    Total Bilirubin 0.6 0.1 - 1.0 mg/dL    Alkaline Phosphatase 59 55 - 135 U/L    AST 16 10 - 40 U/L    ALT 9 (L) 10 - 44 U/L    eGFR >60 >60 mL/min/1.73 m^2    Anion Gap 8 8 - 16 mmol/L   Magnesium    Collection Time: 09/25/23  4:49 AM   Result Value Ref Range    Magnesium 2.0 1.6 - 2.6 mg/dL   Phosphorus    Collection Time: 09/25/23  4:49 AM   Result Value Ref Range    Phosphorus 2.4 (L) 2.7 - 4.5 mg/dL   CBC with Automated Differential    Collection Time: 09/25/23  4:49 AM   Result Value Ref Range    WBC 7.80 3.90 - 12.70 K/uL    RBC 4.63 4.60 - 6.20 M/uL    Hemoglobin 14.1 14.0 - 18.0 g/dL    Hematocrit 43.0 40.0 - 54.0 %    MCV 93 82 - 98 fL    MCH 30.5 27.0 - 31.0 pg    MCHC 32.8 32.0 - 36.0 g/dL    RDW 13.7 11.5 - 14.5 %    Platelets 258 150 - 450 K/uL    MPV 9.1 (L) 9.2 - 12.9 fL    Immature Granulocytes 0.4 0.0 - 0.5 %    Gran # (ANC) 6.0 1.8 - 7.7 K/uL    Immature Grans (Abs) 0.03 0.00 - 0.04 K/uL    Lymph # 1.3 1.0 - 4.8 K/uL    Mono # 0.4 0.3 - 1.0 K/uL    Eos # 0.1 0.0 - 0.5 K/uL    Baso # 0.02 0.00 - 0.20 K/uL    nRBC 0 0 /100 WBC    Gran % 76.5 (H) 38.0 - 73.0 %    Lymph % 16.9 (L) 18.0 - 48.0 %    Mono % 5.1 4.0 - 15.0 %    Eosinophil % 0.8 0.0 - 8.0 %    Basophil % 0.3 0.0 - 1.9 %    Differential Method Automated    Echo    Collection Time: 09/25/23  9:15 AM   Result Value Ref Range    BSA 2.06 m2    LVOT stroke volume 110.48 cm3    LVIDd 4.57 3.5 - 6.0 cm    LV Systolic Volume 35.99 mL    LV Systolic Volume Index 17.3 mL/m2    LVIDs 3.03 2.1 - 4.0 cm    LV Diastolic Volume 96.07 mL    LV Diastolic Volume Index 46.19 mL/m2    IVS 0.99 0.6 - 1.1 cm    LVOT diameter 2.23 cm    LVOT area 3.9 cm2    FS 34 28 - 44 %    Left Ventricle Relative Wall Thickness 0.46 cm    Posterior Wall 1.06 0.6 - 1.1 cm    LV mass 162.57 g    LV Mass Index 78 g/m2    MV Peak E Shan  0.98 m/s    TDI LATERAL 0.14 m/s    TDI SEPTAL 0.11 m/s    E/E' ratio 7.84 m/s    MV Peak A Shan 0.49 m/s    TR Max Shan 2.21 m/s    E/A ratio 2.00     IVRT 95.15 msec    E wave deceleration time 236.25 msec    LV SEPTAL E/E' RATIO 8.91 m/s    LV LATERAL E/E' RATIO 7.00 m/s    LVOT peak shan 1.42 m/s    Left Ventricular Outflow Tract Mean Velocity 0.93 cm/s    Left Ventricular Outflow Tract Mean Gradient 4.07 mmHg    LA size 2.74 cm    Left Atrium Minor Axis 4.93 cm    Left Atrium Major Axis 4.98 cm    RVDD 3.94 cm    RV S' 15.58 cm/s    TAPSE 1.78 cm    RA Major Axis 5.28 cm    AV mean gradient 6 mmHg    AV peak gradient 9 mmHg    Ao peak shan 1.47 m/s    Ao VTI 31.20 cm    LVOT peak VTI 28.30 cm    AV valve area 3.54 cm²    AV Velocity Ratio 0.97     AV index (prosthetic) 0.91     NNEKA by Velocity Ratio 3.77 cm²    MV mean gradient 2 mmHg    MV peak gradient 4 mmHg    MV stenosis pressure 1/2 time 68.51 ms    MV valve area p 1/2 method 3.21 cm2    MV valve area by continuity eq 3.59 cm2    MV VTI 30.8 cm    Triscuspid Valve Regurgitation Peak Gradient 20 mmHg    PV PEAK VELOCITY 1.06 m/s    PV peak gradient 4 mmHg    Sinus 3.09 cm    STJ 2.38 cm    Ascending aorta 3.10 cm    IVC diameter 2.13 cm    Mean e' 0.13 m/s    ZLVIDS -1.99     ZLVIDD -3.32     LA Volume Index 21.6 mL/m2    LA volume 45.01 cm3    LA WIDTH 3.9 cm    RA Width 4.7 cm    TV resting pulmonary artery pressure 23 mmHg    RV TB RVSP 5 mmHg    Est. RA pres 3 mmHg   VANCOMYCIN, TROUGH    Collection Time: 09/26/23 12:35 AM   Result Value Ref Range    Vancomycin-Trough 10.2 10.0 - 22.0 ug/mL   Comprehensive Metabolic Panel (CMP)    Collection Time: 09/26/23  4:50 AM   Result Value Ref Range    Sodium 137 136 - 145 mmol/L    Potassium 4.3 3.5 - 5.1 mmol/L    Chloride 103 95 - 110 mmol/L    CO2 28 23 - 29 mmol/L    Glucose 144 (H) 70 - 110 mg/dL    BUN 7 6 - 20 mg/dL    Creatinine 0.8 0.5 - 1.4 mg/dL    Calcium 9.0 8.7 - 10.5 mg/dL    Total Protein 7.1 6.0 -  8.4 g/dL    Albumin 2.9 (L) 3.5 - 5.2 g/dL    Total Bilirubin 0.4 0.1 - 1.0 mg/dL    Alkaline Phosphatase 55 55 - 135 U/L    AST 16 10 - 40 U/L    ALT 8 (L) 10 - 44 U/L    eGFR >60 >60 mL/min/1.73 m^2    Anion Gap 6 (L) 8 - 16 mmol/L   Magnesium    Collection Time: 09/26/23  4:50 AM   Result Value Ref Range    Magnesium 2.0 1.6 - 2.6 mg/dL   Phosphorus    Collection Time: 09/26/23  4:50 AM   Result Value Ref Range    Phosphorus 3.4 2.7 - 4.5 mg/dL   CBC with Automated Differential    Collection Time: 09/26/23  4:50 AM   Result Value Ref Range    WBC 7.24 3.90 - 12.70 K/uL    RBC 4.52 (L) 4.60 - 6.20 M/uL    Hemoglobin 14.1 14.0 - 18.0 g/dL    Hematocrit 44.4 40.0 - 54.0 %    MCV 98 82 - 98 fL    MCH 31.2 (H) 27.0 - 31.0 pg    MCHC 31.8 (L) 32.0 - 36.0 g/dL    RDW 13.6 11.5 - 14.5 %    Platelets 254 150 - 450 K/uL    MPV 9.4 9.2 - 12.9 fL    Immature Granulocytes 0.1 0.0 - 0.5 %    Gran # (ANC) 4.7 1.8 - 7.7 K/uL    Immature Grans (Abs) 0.01 0.00 - 0.04 K/uL    Lymph # 1.8 1.0 - 4.8 K/uL    Mono # 0.5 0.3 - 1.0 K/uL    Eos # 0.2 0.0 - 0.5 K/uL    Baso # 0.04 0.00 - 0.20 K/uL    nRBC 0 0 /100 WBC    Gran % 65.2 38.0 - 73.0 %    Lymph % 24.6 18.0 - 48.0 %    Mono % 6.6 4.0 - 15.0 %    Eosinophil % 2.9 0.0 - 8.0 %    Basophil % 0.6 0.0 - 1.9 %    Differential Method Automated        Microbiology Results (last 7 days)     Procedure Component Value Units Date/Time    Aerobic culture (Specify Source) **CANNOT BE ORDERED AS STAT** [[id number]]     Order Status: Canceled Specimen: Wound from Ankle, Right           Imaging Results          US Lower Extremity Veins Right (Final result)  Result time 09/24/23 13:26:33    Final result by Hector Underwood MD (09/24/23 13:26:33)                 Impression:      Nonocclusive DVT in the femoral and popliteal veins with collateral vessel formation, which may be chronic.    Solitary peroneal and also posterior tibial veins are identified and appear patent, noting thrombosis of a  nonvisualized potential paired vein not excluded on the basis of this examination.      Electronically signed by: Hector Underwood MD  Date:    09/24/2023  Time:    13:26             Narrative:    EXAMINATION:  US LOWER EXTREMITY VEINS RIGHT    CLINICAL HISTORY:  Other specified soft tissue disorders    TECHNIQUE:  Duplex and color flow Doppler evaluation and graded compression of the right lower extremity veins was performed.    COMPARISON:  None    FINDINGS:  Right thigh veins: Nonocclusive thrombus seen within the femoral vein and popliteal vein with multiple collateral vessels also noted, presumably chronic.  Common femoral and greater saphenous veins appear patent with normal directional flow and waveforms.    Right calf veins: Paired anterior tibial veins are patent.  Solitary peroneal and also posterior tibial veins are identified and appear patent, which may be normal variant.    Contralateral CFV: The contralateral (left) common femoral vein is patent and free of thrombus.    Miscellaneous: None                               X-Ray Ankle Complete Right (Final result)  Result time 09/24/23 12:00:59    Final result by Stacy Tierney MD (09/24/23 12:00:59)                 Impression:      As above.      Electronically signed by: Stacy Tierney MD  Date:    09/24/2023  Time:    12:00             Narrative:    EXAMINATION:  XR ANKLE COMPLETE 3 VIEW RIGHT    CLINICAL HISTORY:  Cellulitis of right lower limb    TECHNIQUE:  AP, lateral, and oblique images of the right ankle were performed.    COMPARISON:  None    FINDINGS:  No acute fracture or bony destructive process is seen.  Alignment is preserved.  There is soft tissue swelling about the ankle.  Overlying bandage material obscures detail somewhat.  There is irregularity of the skin surface posterior to the distal tibia and fibula; it would be difficult to exclude a skin defect or open wound at this site.  No definite air is seen in the soft tissues, but  this could be obscured by the bandage material.                                    Pending Diagnostic Studies:     None         Medications:  Reconciled Home Medications:      Medication List      START taking these medications    dextrose 5 % in water (D5W) PgBk 100 mL with ceFEPIme 2 gram SolR 2 g  Inject 2 g into the vein every 8 (eight) hours. for 24 days     ELIQUIS 5 mg Tab  Generic drug: apixaban  Take 1 tablet by moth twice daily     HYDROcodone-acetaminophen  mg per tablet  Commonly known as: NORCO  Take 1 tablet by mouth every 8 (eight) hours as needed.        CONTINUE taking these medications    famotidine 20 MG tablet  Commonly known as: PEPCID  Take 1 tablet (20 mg total) by mouth 2 (two) times daily.        STOP taking these medications    ibuprofen 600 MG tablet  Commonly known as: ADVIL,MOTRIN     meloxicam 15 MG tablet  Commonly known as: MOBIC     traMADoL 50 mg tablet  Commonly known as: ULTRAM            Indwelling Lines/Drains at time of discharge:   Lines/Drains/Airways     Peripherally Inserted Central Catheter Line  Duration           PICC Double Lumen 09/26/23 1840 right basilic <1 day                Time spent on the discharge of patient: Greater than 35 minutes         Xochilt Jay DO  Department of Hospital Medicine  [facility]

## 2023-09-27 NOTE — PLAN OF CARE
TN sent a secure jeff to med surg nurse Mony that this patient is clear for discharge from case management's viewpoint.  Has [facility] home [facility], will see Friday and [facility] has completed the teaching and setting up delivery of antibiotics.  Pcp appt on Friday morning at [facility].   09/27/23 1246   Final Note   Assessment Type Final Discharge Note   Anticipated Discharge Disposition Home-Health  (home infusion)   What phone number can be called within the next 1-3 days to see how you are doing after discharge?   (see chart)   Hospital Resources/Appts/Education Provided Provided patient/caregiver with written discharge plan information;Appointments scheduled and added to AVS   Post-Acute Status   Post-Acute Authorization Home Health;IV Infusion   Coverage [employer]   IV Infusion Status Set-up Complete/Auth obtained   Patient choice form signed by patient/caregiver List with quality metrics by geographic area provided   Discharge Delays None known at this time

## 2023-09-27 NOTE — NURSING
Patient received Meds from pharmacy. No question regarding discharge or wound care. Walking boots applied to right leg patient sates feels better since boot was applied. Transferred to car via wheelchair thru ED.Wound care completed prior to transferred with instructions given to patient

## 2023-09-27 NOTE — CONSULTS
"[facility] [place] Med Surg  Infectious Disease  Consult Note    Patient Name: Jesse Fofana  MRN: [id number]  Admission Date: 9/24/2023  Hospital Length of Stay: 3 days  Attending Physician: Xochilt Jay DO  Primary Care Provider: No, Primary Doctor     Isolation Status: No active isolations    Patient information was obtained from patient and ER records.      Consults  Assessment/Plan:     ID  Bacteremia  43M with h/o PE admitted 9/24 with several months of a progressive R lower leg wound- warmth, foul smelling drainage. Bcx 9/2 with MSSA and acenitobacter in one of 2; repeat weren't drawn until 9/24 NGTD. 2d echo without veg. Surgery not recommending debridment, wound care following. R ankle MRI ordered- done, read pending. Has received vanc/zosyn/cipro. ID consulted for "IV antibiotic recommendations, MSSA/acinetobacter bacteremia    Staph aureus can never be disregarded as a contaminant, and pt hasn't had adequate treatment. Suspect R lower leg wound source of bacteremia. Fortunately no abscess or osteo seen on MRI of R lower leg. Treating complicated bacteremia from SSTI source    Recommendation:  - continue cefepime with plan for 4 weeks total  - wound care as per primary team  - picc (or midline)  - note patient said he's living in a hotel, but he does have refrigerator, so would discuss feasibility of iv abx here with the infusion company prior to d/c    Outpatient Antibiotic Therapy Plan:    Please send referral to [facility].    1) Infection: bacteremia    2) Discharge Antibiotics:    Intravenous antibiotics:   Cefepime 2gm iv q8h (or 6gm daily continuous infusion)    3) Therapy Duration:  4 weeks    Estimated end date of IV antibiotics: 10/21    4) Outpatient Weekly Labs:    Order the following labs to be drawn on Mondays:    CBC   CMP    CRP      5) Fax Lab Results to Infectious Diseases Provider: Dr Robin    [facility] Fax Number: [phone]    6) " Outpatient Infectious Diseases Follow-up     Follow-up appointment will be arranged by the ID clinic and will be found in the patient's appointments tab.     Prior to discharge, please ensure the patient's follow-up has been scheduled.     If there is still no follow-up scheduled prior to discharge, please send an EPIC message to Barbra Montaño in Infectious Diseases.            Discussed assessment/plan with hospitalist          Thank you for your consult. I will sign off. Please contact us if you have any additional questions.    Nery Robin MD  Infectious Disease  [facility] - Med Surg    Subjective:     Principal Problem: Cellulitis of right lower extremity    HPI:   43M with h/o PE (stopped eliquis)  admitted 9/24 with R ankle wound. Reports several ED visits for the same. Was given several different antibiotics including bactrim, cipro, dox at prior visits, but didn't complete. 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 Wears steel toe boots. Reports drainage from R lower leg wound recently. Reports leg warm. Says he has hardware in L leg, but denies R leg hardware. Denies f/c.       Bcx 9/2 with MSSA and acenitobacter in one of 2; repeat weren't drawn until 9/24 NGTD    2d echo ordered, pending    Blood culture #1 **CANNOT BE ORDERED STAT** [[id number]] Collected: 09/24/23 1121   Order Status: Completed Specimen: Blood from Peripheral, Upper Arm, Right Updated: 09/24/23 2112    Blood Culture, Routine No Growth to date   Blood culture #2 **CANNOT BE ORDERED STAT** [[id number]] Collected: 09/24/23 1121   Order Status: Completed Specimen: Blood from Peripheral, Antecubital, Right Updated: 09/24/23 2112    Blood Culture, Routine No Growth to date       Blood culture [[id number]] Collected: 09/02/23 1656   Order Status: Completed Specimen: Blood from Peripheral, Antecubital, Left Updated: 09/06/23 1903    Blood Culture, Routine No Growth after 4 days.   Blood  "culture [[id number]] (Abnormal)  Collected: 09/02/23 1656   Order Status: Completed Specimen: Blood from Peripheral, Antecubital, Left Updated: 09/06/23 0742    Blood Culture, Routine Gram stain aer bottle: Gram positive cocci in pairs     Results called to and read back by: Dr. Moreno 09/03/2023  09:38     Gram stain meryl bottle: Gram positive cocci     Positive results previously called     Gram stain aer bottle: Gram positive cocci in pairs Gram negative rods     Results reviewed and amended. Called to and read back by: Mariposa Bhakta     09/04/2023  11:34     ACINETOBACTER LWOFFII GROUP Abnormal      STAPHYLOCOCCUS AUREUS Abnormal    Susceptibility     Acinetobacter lwoffii group Staphylococcus aureus     CULTURE, BLOOD CULTURE, BLOOD     Amikacin <=16 mcg/mL Sensitive       Amp/Sulbactam <=8/4 mcg/mL Sensitive       Cefepime <=2 mcg/mL Sensitive       Ceftriaxone 8 mcg/mL Sensitive       Ciprofloxacin <=1 mcg/mL Sensitive       Clindamycin   <=0.5 mcg/mL Resistant     Erythromycin   >4 mcg/mL Resistant     Gentamicin <=4 mcg/mL Sensitive       Levofloxacin <=2 mcg/mL Sensitive       Meropenem <=1 mcg/mL Sensitive       Minocycline <=4 mcg/mL Sensitive       Oxacillin   0.5 mcg/mL Sensitive     Penicillin   >8 mcg/mL Resistant     Tetracycline <=4 mcg/mL Sensitive <=4 mcg/mL Sensitive     Tobramycin <=4 mcg/mL Sensitive       Trimeth/Sulfa >2/38 mcg/mL Resistant <=0.5/9.5 m... Sensitive     Vancomycin   2 mcg/mL Sensitive            Vascular consult pending    MRI R ankle ordered    Wound care following        Has received vanc/zosyn/cipro    ID consulted for "IV antibiotic recommendations, MSSA/acinetobacter bacteremia      Interval history: NAEO. Awaiting d/c. Asking for pain meds. Completed iv antibitic teaching and doesn't have questions/concerns    History reviewed. No pertinent surgical history.    Review of patient's allergies indicates:  No Known Allergies    No current facility-administered medications on " file prior to encounter.     Current Outpatient Medications on File Prior to Encounter   Medication Sig    famotidine (PEPCID) 20 MG tablet Take 1 tablet (20 mg total) by mouth 2 (two) times daily.     Family History    None       Tobacco Use    Smoking status: Every Day     Current packs/day: 1.00     Average packs/day: 1 pack/day for 25.0 years (25.0 ttl pk-yrs)     Types: Cigarettes     Start date: 9/24/1998    Smokeless tobacco: Never   Substance and Sexual Activity    Alcohol use: Yes     Alcohol/week: 3.0 - 4.0 standard drinks of alcohol     Types: 3 - 4 Cans of beer per week     Comment: daily drinker    Drug use: Not Currently    Sexual activity: Not on file     Review of Systems   Constitutional:  Negative for chills and fever.   Respiratory:  Negative for cough and shortness of breath.      Objective:     Vital Signs (Most Recent):  Temp: (!) 39.2 °F (4 °C) (09/27/23 1100)  Pulse: 73 (09/27/23 1054)  Resp: 20 (09/27/23 1054)  BP: (!) 140/92 (09/27/23 1054)  SpO2: 100 % (09/27/23 1054) Vital Signs (24h Range):  Temp:  [39.2 °F (4 °C)-98.7 °F (37.1 °C)] 39.2 °F (4 °C)  Pulse:  [57-78] 73  Resp:  [17-20] 20  SpO2:  [96 %-100 %] 100 %  BP: (138-156)/(82-97) 140/92     Weight: 80.1 kg (176 lb 9.4 oz)  Body mass index is 22.07 kg/m².     Physical Exam  Vitals and nursing note reviewed.   Constitutional:       Appearance: He is not ill-appearing.   HENT:      Head: Normocephalic and atraumatic.   Cardiovascular:      Rate and Rhythm: Normal rate and regular rhythm.      Pulses: Normal pulses.      Heart sounds: No murmur heard.  Pulmonary:      Effort: Pulmonary effort is normal. No respiratory distress.   Abdominal:      General: Bowel sounds are normal. There is no distension.      Tenderness: There is no abdominal tenderness.   Musculoskeletal:      Comments: Right lower extremity wrapped with gauze/bandaged  -    Skin:     Comments: R leg warm. Copious drainage from R lower leg, foul smelling and very  tender. Palpable fluid collection   Neurological:      General: No focal deficit present.      Mental Status: He is alert and oriented to person, place, and time.   Psychiatric:         Mood and Affect: Mood normal.         Thought Content: Thought content normal.                  Significant Labs: All pertinent labs within the past 24 hours have been reviewed.    Significant Imaging: I have reviewed all pertinent imaging results/findings within the past 24 hours.

## 2023-09-27 NOTE — PLAN OF CARE
Chart check complete, pt aaox4, able to verbalize needs.  PICC Infusing antibiotics as ordered.  Urinal in reach for use.  Tele box monitored.  Dressing to RLE intact.  No reports of pain.  Diet tolerated well, no N/V noted.  No acute distress, pt free from falls or injury this shift.  Bed in low position, wheels locked, call light in reach for assistance, will continue to monitor.      Problem: Adult Inpatient Plan of Care  Goal: Plan of Care Review  Outcome: Ongoing, Progressing     Problem: Adult Inpatient Plan of Care  Goal: Patient-Specific Goal (Individualized)  Outcome: Ongoing, Progressing     Problem: Adult Inpatient Plan of Care  Goal: Absence of Hospital-Acquired Illness or Injury  Outcome: Ongoing, Progressing     Problem: Adult Inpatient Plan of Care  Goal: Optimal Comfort and Wellbeing  Outcome: Ongoing, Progressing     Problem: Fall Injury Risk  Goal: Absence of Fall and Fall-Related Injury  Outcome: Ongoing, Progressing     Problem: Pain Acute  Goal: Acceptable Pain Control and Functional Ability  Outcome: Ongoing, Progressing     Problem: Impaired Wound Healing  Goal: Optimal Wound Healing  Outcome: Ongoing, Progressing

## 2023-09-27 NOTE — PROCEDURES
"Jesse Fofana is a 43 y.o. male patient.    Temp: 98 °F (36.7 °C) (09/26/23 1109)  Pulse: (!) 59 (09/26/23 1109)  Resp: 18 (09/26/23 1851)  BP: 139/88 (09/26/23 1109)  SpO2: 98 % (09/26/23 1109)  Weight: 80.1 kg (176 lb 9.4 oz) (09/24/23 1652)  Height: 6' 3" (190.5 cm) (09/24/23 1652)    PICC  Date/Time: 9/26/2023 6:40 PM  Performed by: Maco Skinner, RN  Consent Done: Yes  Time out: Immediately prior to procedure a time out was called to verify the correct patient, procedure, equipment, support staff and site/side marked as required  Indications: med administration  Anesthesia: local infiltration  Local anesthetic: lidocaine 1% without epinephrine  Anesthetic Total (mL): 1  Preparation: skin prepped with ChloraPrep  Skin prep agent dried: skin prep agent completely dried prior to procedure  Sterile barriers: all five maximum sterile barriers used - cap, mask, sterile gown, sterile gloves, and large sterile sheet  Hand hygiene: hand hygiene performed prior to central venous catheter insertion  Location details: right basilic  Catheter type: double lumen  Catheter size: 5 Fr  Catheter Length: 40cm    Ultrasound guidance: yes  Vessel Caliber: large and patent, compressibility normal  Needle advanced into vessel with real time Ultrasound guidance.  Guidewire confirmed in vessel.  Sterile sheath used.  Number of attempts: 1  Post-procedure: blood return through all ports, chlorhexidine patch and sterile dressing applied  Estimated blood loss (mL): 0            Name Maco Skinner   9/26/2023    "

## 2023-09-27 NOTE — NURSING
AVS virtually reviewed with patient in its entirety with emphasis on medications, follow-up appointments and reasons to return to the ED or contact the [facility]. Patient also encouraged to utilize their patient portal. Ease and convenience of use reiterated. Education complete and patient voiced understanding. All questions answered. Discharge teaching completed.

## 2023-09-27 NOTE — PLAN OF CARE
Problem: Adult Inpatient Plan of Care  Goal: Plan of Care Review  Outcome: Adequate for Care Transition  Goal: Patient-Specific Goal (Individualized)  Outcome: Adequate for Care Transition  Goal: Absence of Hospital-Acquired Illness or Injury  Outcome: Adequate for Care Transition  Goal: Optimal Comfort and Wellbeing  Outcome: Adequate for Care Transition  Goal: Readiness for Transition of Care  Outcome: Adequate for Care Transition     Problem: Fall Injury Risk  Goal: Absence of Fall and Fall-Related Injury  Outcome: Adequate for Care Transition     Problem: Pain Acute  Goal: Acceptable Pain Control and Functional Ability  Outcome: Adequate for Care Transition     Problem: Infection  Goal: Absence of Infection Signs and Symptoms  Outcome: Adequate for Care Transition     Problem: Impaired Wound Healing  Goal: Optimal Wound Healing  Outcome: Adequate for Care Transition

## 2023-09-27 NOTE — NURSING
[facility]  Nurses Note -- 4 Eyes      9/27/2023       Skin assessed on: Q Shift      [x] No Pressure Injuries Present    [x]Prevention Measures Documented    [] Yes LDA  for Pressure Injury Previously documented     [] Yes New Pressure Injury Discovered   [] LDA for New Pressure Injury Added      Attending RN:  Jalyn Rodriges RN     Second RN:  JUAN DAVID Charles

## 2023-09-27 NOTE — PLAN OF CARE
[facility]      HOME HEALTH ORDERS  FACE TO FACE ENCOUNTER    Patient Name: Jesse Fofana  YOB: 1980    PCP: No, Primary Doctor   PCP Address: None  PCP Phone Number: None  PCP Fax: None    Encounter Date: 9/24/23    Admit to Home Health    Diagnoses:  Active Hospital Problems    Diagnosis  POA    *Cellulitis of right lower extremity [L03.115]  Yes     Priority: 1 - High    Bacteremia [R78.81]  Yes     Priority: 2     Hypophosphatemia [E83.39]  Yes     Priority: 3     History of pulmonary embolus (PE) [Z86.711]  Yes     Priority: 4     Acute deep vein thrombosis (DVT) of right lower extremity [I82.401]  Yes     Priority: 5     Tobacco use [Z72.0]  Yes     Priority: 6      Last Assessment & Plan:   Formatting of this note might be different from the original.  History & Physical He was counseled to stop smoking and agreed to a nicotine patch.    Discharge Summary Provided nicotine patch.  Counseled on cessation    Follow-up Nicotine patch        Resolved Hospital Problems   No resolved problems to display.       Follow Up Appointments:  Future Appointments   Date Time Provider Department Center   9/29/2023  9:30 AM Kurt Gray NP [facility]       Allergies:Review of patient's allergies indicates:  No Known Allergies    Medications: Review discharge medications with patient and family and provide education.    Current Facility-Administered Medications   Medication Dose Route Frequency Provider Last Rate Last Admin    acetaminophen tablet 650 mg  650 mg Oral Q4H PRN Phong Palomino MD        apixaban tablet 10 mg  10 mg Oral BID Phong Palomino MD   10 mg at 09/27/23 0806    ceFEPIme (MAXIPIME) 2 g in dextrose 5 % in water (D5W) 100 mL IVPB (MB+)  2 g Intravenous Q8H Nery Robin MD   Stopped at 09/27/23 0700    dextrose 10% bolus 125 mL 125 mL  12.5 g Intravenous PRN Phong Palomino MD        dextrose 10% bolus 250 mL 250 mL  25 g Intravenous PRN Phong Palomino MD         glucagon (human recombinant) injection 1 mg  1 mg Intramuscular PRN Phong Palomino MD        glucose chewable tablet 16 g  16 g Oral PRN Phong Palomino MD        glucose chewable tablet 24 g  24 g Oral PRN Phong Palomino MD        HYDROcodone-acetaminophen  mg per tablet 1 tablet  1 tablet Oral Q6H PRN Misty Navya-Manju T., DO   1 tablet at 09/27/23 0902    HYDROcodone-acetaminophen 5-325 mg per tablet 1 tablet  1 tablet Oral Q6H PRN Phong Palomino MD   1 tablet at 09/26/23 1124    naloxone 0.4 mg/mL injection 0.02 mg  0.02 mg Intravenous PRN Phong Palomino MD        ondansetron injection 4 mg  4 mg Intravenous Q8H PRN Phong Palomino MD        oxyCODONE immediate release tablet 5 mg  5 mg Oral Q6H PRN [facility]   5 mg at 09/27/23 0937    sodium chloride 0.9% flush 10 mL  10 mL Intravenous Q12H PRN Phong Palomino MD        sodium chloride 0.9% flush 10 mL  10 mL Intravenous Q6H [facility]   10 mL at 09/27/23 0600    And    sodium chloride 0.9% flush 10 mL  10 mL Intravenous PRN Misty [facility] T., DO         Current Discharge Medication List        START taking these medications    Details   apixaban (ELIQUIS) 5 mg Tab Take 1 tablet (5 mg total) by mouth 2 (two) times daily.  Qty: 60 tablet, Refills: 2    Comments: Take 10mg (two tablets) twice a day for 3 more days. Then after that, started taking 5mg (one tablet) twice a day.      dextrose 5 % in water (D5W) PgBk 100 mL with ceFEPIme 2 gram SolR 2 g Inject 2 g into the vein every 8 (eight) hours. for 24 days  Qty: 2 g, Refills: 23      HYDROcodone-acetaminophen (NORCO)  mg per tablet Take 1 tablet by mouth every 8 (eight) hours as needed.  Qty: 10 tablet, Refills: 0    Comments: Quantity prescribed more than 7 day supply? No           CONTINUE these medications which have NOT CHANGED    Details   famotidine (PEPCID) 20 MG tablet Take 1 tablet (20 mg total) by mouth 2 (two) times daily.  Qty: 60 tablet, Refills: 0            STOP taking these medications       meloxicam (MOBIC) 15 MG tablet Comments:   Reason for Stopping:         traMADoL (ULTRAM) 50 mg tablet Comments:   Reason for Stopping:         ibuprofen (ADVIL,MOTRIN) 600 MG tablet Comments:   Reason for Stopping:                 I have seen and examined this patient within the last 30 days. My clinical findings that support the need for the home health skilled services and home bound status are the following:no   Weakness/numbness causing balance and gait disturbance due to Infection making it taxing to leave home.     Diet:   regular diet    Labs:  Outpatient Antibiotic Therapy Plan:     Please send referral to [facility].     1) Infection: bacteremia     2) Discharge Antibiotics:     Intravenous antibiotics:  Cefepime 2gm iv q8h (or 6gm daily continuous infusion)     3) Therapy Duration:  4 weeks     Estimated end date of IV antibiotics: 10/21/2023     4) Outpatient Weekly Labs:     Order the following labs to be drawn on Mondays:   CBC  CMP   CRP        5) Fax Lab Results to Infectious Diseases Provider: Dr Robin     [facility] Fax Number: [phone]     6) Outpatient Infectious Diseases Follow-up     Follow-up appointment will be arranged by the ID clinic and will be found in the patient's appointments tab.     Prior to discharge, please ensure the patient's follow-up has been scheduled.    If there is still no follow-up scheduled prior to discharge, please send an EPIC message to Barbra Montaño in Infectious Diseases.       Referrals/ Consults  Home infusion GUSTAVO antibiotics   Wound care    Activities:   activity as tolerated    Nursing:   Agency to admit patient within 24 hours of hospital discharge unless specified on physician order or at patient request    SN to complete comprehensive assessment including routine vital signs. Instruct on disease process and s/s of complications to report to MD. Review/verify medication  list sent home with the patient at time of discharge  and instruct patient/caregiver as needed. Frequency may be adjusted depending on start of care date.     Skilled nurse to perform up to 3 visits PRN for symptoms related to diagnosis    Notify MD if SBP > 160 or < 90; DBP > 90 or < 50; HR > 120 or < 50; Temp > 101; O2 < 88%;     Ok to schedule additional visits based on staff availability and patient request on consecutive days within the home health episode.    When multiple disciplines ordered:    Start of Care occurs on Sunday - Wednesday schedule remaining discipline evaluations as ordered on separate consecutive days following the start of care.    Thursday SOC -schedule subsequent evaluations Friday and Monday the following week.     Friday - Saturday SOC - schedule subsequent discipline evaluations on consecutive days starting Monday of the following week.    For all post-discharge communication and subsequent orders please contact patient's primary care physician.     Miscellaneous   Home Infusion Therapy:   SN to perform Infusion Therapy/Central Line Care.  Review Central Line Care & Central Line Flush with patient.    Administer (drug and dose): Cefepime   2g every 8 hrs  Last dose given: 09/27/2023 at 0630                           Home dose due: 09/27/2023 at 1515    Scrub the Hub: Prior to accessing the line, always perform a 30 second alcohol scrub  Each lumen of the central line is to be flushed at least daily with 10 mL Normal Saline and 3 mL Heparin flush (10 units/mL)  Skilled Nurse (SN) may draw blood from IV access  Blood Draw Procedure:   - Aspirate at least 5 mL of blood   - Discard   - Obtain specimen   - Change injection cap   - Flush with 20 mL Normal Saline followed by a                 3-5 mL Heparin flush (10 units/mL)  Central :   - Sterile dressing changes are done weekly and as needed.   - Use chlor-hexadine scrub to cleanse site, apply Biopatch to insertion site,        apply securement device dressing   - Injection caps are changed weekly and after EVERY lab draw.   - If sterile gauze is under dressing to control oozing,                 dressing change must be performed every 24 hours until gauze is not needed.    Home Health Aide:  Nursing Twice weekly    Wound Care Orders  Right posterior lower extremity ulcer- Ulceration 10 cm(L) 9.5 cm(W) with moderate amount yellow drainage. Base of wound covered with yellow slough. Painful to touch.     Treatment:  Cleansed wound with Vashe. Dressed with Aquacel Ag hydrofiber covered with Mepilex foam 6x6 dressing. Applied single layer of Tubigrip D for light compression.   Instructed patient on smoking affecting wound healing. Also explained use of compression to promote wound healing and managing edema.       I certify that this patient is confined to his home and needs intermittent skilled nursing care.

## 2023-09-27 NOTE — SUBJECTIVE & OBJECTIVE
Interval history: NAEO. Awaiting d/c. Asking for pain meds. Completed iv antibitic teaching and doesn't have questions/concerns    History reviewed. No pertinent surgical history.    Review of patient's allergies indicates:  No Known Allergies    No current facility-administered medications on file prior to encounter.     Current Outpatient Medications on File Prior to Encounter   Medication Sig    famotidine (PEPCID) 20 MG tablet Take 1 tablet (20 mg total) by mouth 2 (two) times daily.     Family History    None       Tobacco Use    Smoking status: Every Day     Current packs/day: 1.00     Average packs/day: 1 pack/day for 25.0 years (25.0 ttl pk-yrs)     Types: Cigarettes     Start date: 9/24/1998    Smokeless tobacco: Never   Substance and Sexual Activity    Alcohol use: Yes     Alcohol/week: 3.0 - 4.0 standard drinks of alcohol     Types: 3 - 4 Cans of beer per week     Comment: daily drinker    Drug use: Not Currently    Sexual activity: Not on file     Review of Systems   Constitutional:  Negative for chills and fever.   Respiratory:  Negative for cough and shortness of breath.      Objective:     Vital Signs (Most Recent):  Temp: (!) 39.2 °F (4 °C) (09/27/23 1100)  Pulse: 73 (09/27/23 1054)  Resp: 20 (09/27/23 1054)  BP: (!) 140/92 (09/27/23 1054)  SpO2: 100 % (09/27/23 1054) Vital Signs (24h Range):  Temp:  [39.2 °F (4 °C)-98.7 °F (37.1 °C)] 39.2 °F (4 °C)  Pulse:  [57-78] 73  Resp:  [17-20] 20  SpO2:  [96 %-100 %] 100 %  BP: (138-156)/(82-97) 140/92     Weight: 80.1 kg (176 lb 9.4 oz)  Body mass index is 22.07 kg/m².     Physical Exam  Vitals and nursing note reviewed.   Constitutional:       Appearance: He is not ill-appearing.   HENT:      Head: Normocephalic and atraumatic.   Cardiovascular:      Rate and Rhythm: Normal rate and regular rhythm.      Pulses: Normal pulses.      Heart sounds: No murmur heard.  Pulmonary:      Effort: Pulmonary effort is normal. No respiratory distress.   Abdominal:       General: Bowel sounds are normal. There is no distension.      Tenderness: There is no abdominal tenderness.   Musculoskeletal:      Comments: Right lower extremity wrapped with gauze/bandaged  -    Skin:     Comments: R leg warm. Copious drainage from R lower leg, foul smelling and very tender. Palpable fluid collection   Neurological:      General: No focal deficit present.      Mental Status: He is alert and oriented to person, place, and time.   Psychiatric:         Mood and Affect: Mood normal.         Thought Content: Thought content normal.                  Significant Labs: All pertinent labs within the past 24 hours have been reviewed.    Significant Imaging: I have reviewed all pertinent imaging results/findings within the past 24 hours.

## 2023-09-28 LAB
BACTERIA BLD CULT: NORMAL
BACTERIA BLD CULT: NORMAL

## 2023-10-02 ENCOUNTER — LAB VISIT (OUTPATIENT)
Dept: LAB | Facility: HOSPITAL | Age: 43
End: 2023-10-02
Attending: INTERNAL MEDICINE
Payer: MEDICAID

## 2023-10-02 DIAGNOSIS — R78.81 BACTEREMIA: Primary | ICD-10-CM

## 2023-10-02 DIAGNOSIS — L03.115 CELLULITIS OF RIGHT FOOT: ICD-10-CM

## 2023-10-02 LAB
ALBUMIN SERPL BCP-MCNC: 3.6 G/DL (ref 3.5–5.2)
ALP SERPL-CCNC: 57 U/L (ref 55–135)
ALT SERPL W/O P-5'-P-CCNC: 14 U/L (ref 10–44)
ANION GAP SERPL CALC-SCNC: 12 MMOL/L (ref 8–16)
AST SERPL-CCNC: 19 U/L (ref 10–40)
BASOPHILS # BLD AUTO: 0.03 K/UL (ref 0–0.2)
BASOPHILS NFR BLD: 0.3 % (ref 0–1.9)
BILIRUB SERPL-MCNC: 0.2 MG/DL (ref 0.1–1)
BUN SERPL-MCNC: 14 MG/DL (ref 6–20)
CALCIUM SERPL-MCNC: 8.8 MG/DL (ref 8.7–10.5)
CHLORIDE SERPL-SCNC: 101 MMOL/L (ref 95–110)
CO2 SERPL-SCNC: 24 MMOL/L (ref 23–29)
CREAT SERPL-MCNC: 0.8 MG/DL (ref 0.5–1.4)
CRP SERPL-MCNC: 4.1 MG/L (ref 0–8.2)
DIFFERENTIAL METHOD: ABNORMAL
EOSINOPHIL # BLD AUTO: 0.2 K/UL (ref 0–0.5)
EOSINOPHIL NFR BLD: 1.8 % (ref 0–8)
ERYTHROCYTE [DISTWIDTH] IN BLOOD BY AUTOMATED COUNT: 13.6 % (ref 11.5–14.5)
EST. GFR  (NO RACE VARIABLE): >60 ML/MIN/1.73 M^2
GLUCOSE SERPL-MCNC: 84 MG/DL (ref 70–110)
HCT VFR BLD AUTO: 39.8 % (ref 40–54)
HGB BLD-MCNC: 13.5 G/DL (ref 14–18)
IMM GRANULOCYTES # BLD AUTO: 0.03 K/UL (ref 0–0.04)
IMM GRANULOCYTES NFR BLD AUTO: 0.3 % (ref 0–0.5)
LYMPHOCYTES # BLD AUTO: 2.3 K/UL (ref 1–4.8)
LYMPHOCYTES NFR BLD: 26.3 % (ref 18–48)
MCH RBC QN AUTO: 32.1 PG (ref 27–31)
MCHC RBC AUTO-ENTMCNC: 33.9 G/DL (ref 32–36)
MCV RBC AUTO: 95 FL (ref 82–98)
MONOCYTES # BLD AUTO: 0.8 K/UL (ref 0.3–1)
MONOCYTES NFR BLD: 9.6 % (ref 4–15)
NEUTROPHILS # BLD AUTO: 5.3 K/UL (ref 1.8–7.7)
NEUTROPHILS NFR BLD: 61.7 % (ref 38–73)
NRBC BLD-RTO: 0 /100 WBC
PLATELET # BLD AUTO: 241 K/UL (ref 150–450)
PMV BLD AUTO: 9.9 FL (ref 9.2–12.9)
POTASSIUM SERPL-SCNC: 3.8 MMOL/L (ref 3.5–5.1)
PROT SERPL-MCNC: 8 G/DL (ref 6–8.4)
RBC # BLD AUTO: 4.21 M/UL (ref 4.6–6.2)
SODIUM SERPL-SCNC: 137 MMOL/L (ref 136–145)
WBC # BLD AUTO: 8.66 K/UL (ref 3.9–12.7)

## 2023-10-02 PROCEDURE — 85025 COMPLETE CBC W/AUTO DIFF WBC: CPT | Performed by: STUDENT IN AN ORGANIZED HEALTH CARE EDUCATION/TRAINING PROGRAM

## 2023-10-02 PROCEDURE — 86140 C-REACTIVE PROTEIN: CPT | Performed by: STUDENT IN AN ORGANIZED HEALTH CARE EDUCATION/TRAINING PROGRAM

## 2023-10-02 PROCEDURE — 80053 COMPREHEN METABOLIC PANEL: CPT | Performed by: STUDENT IN AN ORGANIZED HEALTH CARE EDUCATION/TRAINING PROGRAM

## 2023-10-09 ENCOUNTER — LAB VISIT (OUTPATIENT)
Dept: LAB | Facility: HOSPITAL | Age: 43
End: 2023-10-09
Attending: INTERNAL MEDICINE
Payer: COMMERCIAL

## 2023-10-09 DIAGNOSIS — R78.81 BACTEREMIA: Primary | ICD-10-CM

## 2023-10-09 DIAGNOSIS — L03.115 CELLULITIS OF RIGHT FOOT: ICD-10-CM

## 2023-10-09 LAB
ALBUMIN SERPL BCP-MCNC: 3.7 G/DL (ref 3.5–5.2)
ALP SERPL-CCNC: 62 U/L (ref 55–135)
ALT SERPL W/O P-5'-P-CCNC: 11 U/L (ref 10–44)
ANION GAP SERPL CALC-SCNC: 12 MMOL/L (ref 8–16)
AST SERPL-CCNC: 19 U/L (ref 10–40)
BASOPHILS # BLD AUTO: 0.04 K/UL (ref 0–0.2)
BASOPHILS NFR BLD: 0.5 % (ref 0–1.9)
BILIRUB SERPL-MCNC: 0.4 MG/DL (ref 0.1–1)
BUN SERPL-MCNC: 5 MG/DL (ref 6–20)
CALCIUM SERPL-MCNC: 9.3 MG/DL (ref 8.7–10.5)
CHLORIDE SERPL-SCNC: 104 MMOL/L (ref 95–110)
CO2 SERPL-SCNC: 25 MMOL/L (ref 23–29)
CREAT SERPL-MCNC: 0.8 MG/DL (ref 0.5–1.4)
CRP SERPL-MCNC: 2.4 MG/L (ref 0–8.2)
DIFFERENTIAL METHOD: ABNORMAL
EOSINOPHIL # BLD AUTO: 0.1 K/UL (ref 0–0.5)
EOSINOPHIL NFR BLD: 1.5 % (ref 0–8)
ERYTHROCYTE [DISTWIDTH] IN BLOOD BY AUTOMATED COUNT: 13.8 % (ref 11.5–14.5)
EST. GFR  (NO RACE VARIABLE): >60 ML/MIN/1.73 M^2
GLUCOSE SERPL-MCNC: 75 MG/DL (ref 70–110)
HCT VFR BLD AUTO: 47.9 % (ref 40–54)
HGB BLD-MCNC: 15.8 G/DL (ref 14–18)
IMM GRANULOCYTES # BLD AUTO: 0.01 K/UL (ref 0–0.04)
IMM GRANULOCYTES NFR BLD AUTO: 0.1 % (ref 0–0.5)
LYMPHOCYTES # BLD AUTO: 2.5 K/UL (ref 1–4.8)
LYMPHOCYTES NFR BLD: 32.9 % (ref 18–48)
MCH RBC QN AUTO: 31.1 PG (ref 27–31)
MCHC RBC AUTO-ENTMCNC: 33 G/DL (ref 32–36)
MCV RBC AUTO: 94 FL (ref 82–98)
MONOCYTES # BLD AUTO: 0.5 K/UL (ref 0.3–1)
MONOCYTES NFR BLD: 6.6 % (ref 4–15)
NEUTROPHILS # BLD AUTO: 4.4 K/UL (ref 1.8–7.7)
NEUTROPHILS NFR BLD: 58.4 % (ref 38–73)
NRBC BLD-RTO: 0 /100 WBC
PLATELET # BLD AUTO: 296 K/UL (ref 150–450)
PMV BLD AUTO: 10.1 FL (ref 9.2–12.9)
POTASSIUM SERPL-SCNC: 4.2 MMOL/L (ref 3.5–5.1)
PROT SERPL-MCNC: 8.5 G/DL (ref 6–8.4)
RBC # BLD AUTO: 5.08 M/UL (ref 4.6–6.2)
SODIUM SERPL-SCNC: 141 MMOL/L (ref 136–145)
WBC # BLD AUTO: 7.48 K/UL (ref 3.9–12.7)

## 2023-10-09 PROCEDURE — 86140 C-REACTIVE PROTEIN: CPT | Performed by: INTERNAL MEDICINE

## 2023-10-09 PROCEDURE — 85025 COMPLETE CBC W/AUTO DIFF WBC: CPT | Performed by: INTERNAL MEDICINE

## 2023-10-09 PROCEDURE — 80053 COMPREHEN METABOLIC PANEL: CPT | Performed by: INTERNAL MEDICINE

## 2023-10-11 ENCOUNTER — TELEPHONE (OUTPATIENT)
Dept: INFECTIOUS DISEASES | Facility: CLINIC | Age: 43
End: 2023-10-11
Payer: COMMERCIAL

## 2023-10-18 ENCOUNTER — TELEPHONE (OUTPATIENT)
Dept: INFECTIOUS DISEASES | Facility: CLINIC | Age: 43
End: 2023-10-18
Payer: COMMERCIAL

## 2023-10-18 NOTE — TELEPHONE ENCOUNTER
Zoey with [employer] called  The nurses at the infusion center are not able to get a hold of the pt. She is do for his weekly labs.   Pt's end of care will be on 10/21. Follow up appointment on 10/25

## 2023-10-20 ENCOUNTER — LAB VISIT (OUTPATIENT)
Dept: LAB | Facility: HOSPITAL | Age: 43
End: 2023-10-20
Attending: INTERNAL MEDICINE
Payer: COMMERCIAL

## 2023-10-20 DIAGNOSIS — L03.115 CELLULITIS OF RIGHT FOOT: Primary | ICD-10-CM

## 2023-10-20 DIAGNOSIS — R78.81 BACTEREMIA: ICD-10-CM

## 2023-10-20 PROCEDURE — 80053 COMPREHEN METABOLIC PANEL: CPT | Performed by: INTERNAL MEDICINE

## 2023-10-20 PROCEDURE — 86140 C-REACTIVE PROTEIN: CPT | Performed by: INTERNAL MEDICINE

## 2023-10-20 PROCEDURE — 85025 COMPLETE CBC W/AUTO DIFF WBC: CPT | Performed by: INTERNAL MEDICINE

## 2023-10-21 LAB
ALBUMIN SERPL BCP-MCNC: 3.5 G/DL (ref 3.5–5.2)
ALP SERPL-CCNC: 53 U/L (ref 55–135)
ALT SERPL W/O P-5'-P-CCNC: 10 U/L (ref 10–44)
ANION GAP SERPL CALC-SCNC: 14 MMOL/L (ref 8–16)
AST SERPL-CCNC: 21 U/L (ref 10–40)
BASOPHILS # BLD AUTO: 0.04 K/UL (ref 0–0.2)
BASOPHILS NFR BLD: 0.8 % (ref 0–1.9)
BILIRUB SERPL-MCNC: 0.5 MG/DL (ref 0.1–1)
BUN SERPL-MCNC: 8 MG/DL (ref 6–20)
CALCIUM SERPL-MCNC: 8.9 MG/DL (ref 8.7–10.5)
CHLORIDE SERPL-SCNC: 107 MMOL/L (ref 95–110)
CO2 SERPL-SCNC: 18 MMOL/L (ref 23–29)
CREAT SERPL-MCNC: 0.7 MG/DL (ref 0.5–1.4)
CRP SERPL-MCNC: 10.2 MG/L (ref 0–8.2)
DIFFERENTIAL METHOD: ABNORMAL
EOSINOPHIL # BLD AUTO: 0.1 K/UL (ref 0–0.5)
EOSINOPHIL NFR BLD: 2.1 % (ref 0–8)
ERYTHROCYTE [DISTWIDTH] IN BLOOD BY AUTOMATED COUNT: 14.6 % (ref 11.5–14.5)
EST. GFR  (NO RACE VARIABLE): >60 ML/MIN/1.73 M^2
GLUCOSE SERPL-MCNC: 73 MG/DL (ref 70–110)
HCT VFR BLD AUTO: 42.3 % (ref 40–54)
HGB BLD-MCNC: 13.6 G/DL (ref 14–18)
IMM GRANULOCYTES # BLD AUTO: 0.01 K/UL (ref 0–0.04)
IMM GRANULOCYTES NFR BLD AUTO: 0.2 % (ref 0–0.5)
LYMPHOCYTES # BLD AUTO: 1.9 K/UL (ref 1–4.8)
LYMPHOCYTES NFR BLD: 37 % (ref 18–48)
MCH RBC QN AUTO: 31.2 PG (ref 27–31)
MCHC RBC AUTO-ENTMCNC: 32.2 G/DL (ref 32–36)
MCV RBC AUTO: 97 FL (ref 82–98)
MONOCYTES # BLD AUTO: 0.6 K/UL (ref 0.3–1)
MONOCYTES NFR BLD: 11 % (ref 4–15)
NEUTROPHILS # BLD AUTO: 2.5 K/UL (ref 1.8–7.7)
NEUTROPHILS NFR BLD: 48.9 % (ref 38–73)
NRBC BLD-RTO: 0 /100 WBC
PLATELET # BLD AUTO: 222 K/UL (ref 150–450)
PMV BLD AUTO: 10.2 FL (ref 9.2–12.9)
POTASSIUM SERPL-SCNC: 4.2 MMOL/L (ref 3.5–5.1)
PROT SERPL-MCNC: 7.4 G/DL (ref 6–8.4)
RBC # BLD AUTO: 4.36 M/UL (ref 4.6–6.2)
SODIUM SERPL-SCNC: 139 MMOL/L (ref 136–145)
WBC # BLD AUTO: 5.19 K/UL (ref 3.9–12.7)

## 2023-10-30 ENCOUNTER — TELEPHONE (OUTPATIENT)
Dept: INFECTIOUS DISEASES | Facility: CLINIC | Age: 43
End: 2023-10-30
Payer: COMMERCIAL

## 2023-10-30 NOTE — TELEPHONE ENCOUNTER
PT missed his [facility] appt. We rescheduled him for Nov 9, but phone is not allowing a message and home phone is not working. Tried multiple times to notify about appt.

## 2023-10-31 ENCOUNTER — EXTERNAL HOME HEALTH (OUTPATIENT)
Dept: HOME HEALTH SERVICES | Facility: HOSPITAL | Age: 43
End: 2023-10-31
Payer: COMMERCIAL

## 2023-11-10 ENCOUNTER — TELEPHONE (OUTPATIENT)
Dept: INFECTIOUS DISEASES | Facility: CLINIC | Age: 43
End: 2023-11-10
Payer: COMMERCIAL

## 2023-11-10 ENCOUNTER — DOCUMENT SCAN (OUTPATIENT)
Dept: HOME HEALTH SERVICES | Facility: HOSPITAL | Age: 43
End: 2023-11-10
Payer: COMMERCIAL

## 2023-11-10 NOTE — TELEPHONE ENCOUNTER
Several no-show to clinic. Can not get ahold of patient. Gave orders for infusion company to stop iv abx and pull picc.     ----- Message from Ruthy Gray MA sent at 10/25/2023  4:12 PM CDT -----  [facility] is asking if pt is to d/c abx. He missed his appt with you today.

## 2023-12-25 PROBLEM — I26.99 ACUTE PULMONARY EMBOLISM: Status: RESOLVED | Noted: 2023-05-09 | Resolved: 2023-12-25
